# Patient Record
Sex: MALE | Race: WHITE | NOT HISPANIC OR LATINO | Employment: OTHER | URBAN - METROPOLITAN AREA
[De-identification: names, ages, dates, MRNs, and addresses within clinical notes are randomized per-mention and may not be internally consistent; named-entity substitution may affect disease eponyms.]

---

## 2020-03-04 ENCOUNTER — AMBULATORY - HEALTHEAST (OUTPATIENT)
Dept: LAB | Facility: HOSPITAL | Age: 75
End: 2020-03-04

## 2020-03-04 DIAGNOSIS — E11.9 DIABETES MELLITUS (H): ICD-10-CM

## 2020-03-04 LAB
ALBUMIN SERPL-MCNC: 3.8 G/DL (ref 3.5–5)
ALBUMIN UR-MCNC: NEGATIVE MG/DL
ALP SERPL-CCNC: 68 U/L (ref 45–120)
ALT SERPL W P-5'-P-CCNC: 20 U/L (ref 0–45)
ANION GAP SERPL CALCULATED.3IONS-SCNC: 9 MMOL/L (ref 5–18)
APPEARANCE UR: CLEAR
AST SERPL W P-5'-P-CCNC: 17 U/L (ref 0–40)
BACTERIA #/AREA URNS HPF: ABNORMAL HPF
BILIRUB SERPL-MCNC: 0.6 MG/DL (ref 0–1)
BILIRUB UR QL STRIP: NEGATIVE
BUN SERPL-MCNC: 22 MG/DL (ref 8–28)
CALCIUM SERPL-MCNC: 9.2 MG/DL (ref 8.5–10.5)
CHLORIDE BLD-SCNC: 102 MMOL/L (ref 98–107)
CO2 SERPL-SCNC: 29 MMOL/L (ref 22–31)
COLOR UR AUTO: YELLOW
CREAT SERPL-MCNC: 1.07 MG/DL (ref 0.7–1.3)
CREAT UR-MCNC: 61.9 MG/DL
GFR SERPL CREATININE-BSD FRML MDRD: >60 ML/MIN/1.73M2
GLUCOSE BLD-MCNC: 148 MG/DL (ref 70–125)
GLUCOSE UR STRIP-MCNC: ABNORMAL MG/DL
HGB UR QL STRIP: NEGATIVE
KETONES UR STRIP-MCNC: NEGATIVE MG/DL
LEUKOCYTE ESTERASE UR QL STRIP: NEGATIVE
MICROALBUMIN UR-MCNC: 1.03 MG/DL (ref 0–1.99)
MICROALBUMIN/CREAT UR: 16.6 MG/G
MUCOUS THREADS #/AREA URNS LPF: ABNORMAL LPF
NITRATE UR QL: NEGATIVE
PH UR STRIP: 6 [PH] (ref 4.5–8)
POTASSIUM BLD-SCNC: 3.8 MMOL/L (ref 3.5–5)
PROT SERPL-MCNC: 7.1 G/DL (ref 6–8)
RBC #/AREA URNS AUTO: ABNORMAL HPF
SODIUM SERPL-SCNC: 140 MMOL/L (ref 136–145)
SP GR UR STRIP: 1.02 (ref 1–1.03)
SQUAMOUS #/AREA URNS AUTO: ABNORMAL LPF
UROBILINOGEN UR STRIP-ACNC: ABNORMAL
WBC #/AREA URNS AUTO: ABNORMAL HPF

## 2020-08-27 ENCOUNTER — APPOINTMENT (OUTPATIENT)
Dept: URBAN - METROPOLITAN AREA CLINIC 257 | Age: 75
Setting detail: DERMATOLOGY
End: 2020-08-27

## 2020-08-27 DIAGNOSIS — L57.0 ACTINIC KERATOSIS: ICD-10-CM

## 2020-08-27 DIAGNOSIS — D18.0 HEMANGIOMA: ICD-10-CM

## 2020-08-27 DIAGNOSIS — L82.1 OTHER SEBORRHEIC KERATOSIS: ICD-10-CM

## 2020-08-27 DIAGNOSIS — L57.8 OTHER SKIN CHANGES DUE TO CHRONIC EXPOSURE TO NONIONIZING RADIATION: ICD-10-CM

## 2020-08-27 DIAGNOSIS — D22 MELANOCYTIC NEVI: ICD-10-CM

## 2020-08-27 PROBLEM — D18.01 HEMANGIOMA OF SKIN AND SUBCUTANEOUS TISSUE: Status: ACTIVE | Noted: 2020-08-27

## 2020-08-27 PROBLEM — D22.5 MELANOCYTIC NEVI OF TRUNK: Status: ACTIVE | Noted: 2020-08-27

## 2020-08-27 PROCEDURE — OTHER LIQUID NITROGEN: OTHER

## 2020-08-27 PROCEDURE — 99214 OFFICE O/P EST MOD 30 MIN: CPT | Mod: 25

## 2020-08-27 PROCEDURE — 17004 DESTROY PREMAL LESIONS 15/>: CPT

## 2020-08-27 PROCEDURE — OTHER COUNSELING: OTHER

## 2020-08-27 ASSESSMENT — LOCATION SIMPLE DESCRIPTION DERM
LOCATION SIMPLE: RIGHT FOREARM
LOCATION SIMPLE: LEFT HAND
LOCATION SIMPLE: ABDOMEN
LOCATION SIMPLE: LEFT FOREARM
LOCATION SIMPLE: RIGHT HAND
LOCATION SIMPLE: RIGHT FOREHEAD
LOCATION SIMPLE: NOSE
LOCATION SIMPLE: RIGHT TEMPLE
LOCATION SIMPLE: LEFT FOREHEAD
LOCATION SIMPLE: LEFT UPPER BACK

## 2020-08-27 ASSESSMENT — LOCATION DETAILED DESCRIPTION DERM
LOCATION DETAILED: LEFT VENTRAL PROXIMAL FOREARM
LOCATION DETAILED: LEFT LATERAL ABDOMEN
LOCATION DETAILED: RIGHT VENTRAL DISTAL FOREARM
LOCATION DETAILED: RIGHT MID TEMPLE
LOCATION DETAILED: LEFT SUPERIOR LATERAL FOREHEAD
LOCATION DETAILED: LEFT MID-UPPER BACK
LOCATION DETAILED: NASAL DORSUM
LOCATION DETAILED: LEFT ULNAR DORSAL HAND
LOCATION DETAILED: LEFT LATERAL FOREHEAD
LOCATION DETAILED: LEFT RIB CAGE
LOCATION DETAILED: RIGHT VENTRAL PROXIMAL FOREARM
LOCATION DETAILED: RIGHT ULNAR DORSAL HAND
LOCATION DETAILED: RIGHT PROXIMAL DORSAL FOREARM
LOCATION DETAILED: LEFT VENTRAL DISTAL FOREARM
LOCATION DETAILED: RIGHT RADIAL DORSAL HAND
LOCATION DETAILED: RIGHT SUPERIOR LATERAL FOREHEAD
LOCATION DETAILED: NASAL SUPRATIP
LOCATION DETAILED: LEFT SUPERIOR UPPER BACK
LOCATION DETAILED: LEFT PROXIMAL DORSAL FOREARM

## 2020-08-27 ASSESSMENT — LOCATION ZONE DERM
LOCATION ZONE: NOSE
LOCATION ZONE: ARM
LOCATION ZONE: FACE
LOCATION ZONE: TRUNK
LOCATION ZONE: HAND

## 2020-08-27 NOTE — PROCEDURE: LIQUID NITROGEN
Duration Of Freeze Thaw-Cycle (Seconds): 1
Post-Care Instructions: I reviewed with the patient in detail post-care instructions. Patient is to wear sunprotection, and avoid picking at any of the treated lesions. Pt may apply Vaseline to crusted or scabbing areas.
Detail Level: Simple
Render Post-Care Instructions In Note?: no
Consent: The patient's consent was obtained including but not limited to risks of crusting, scabbing, blistering, scarring, darker or lighter pigmentary change, recurrence, incomplete removal and infection.
Number Of Freeze-Thaw Cycles: 1 freeze-thaw cycle

## 2020-10-10 ENCOUNTER — APPOINTMENT (OUTPATIENT)
Dept: GENERAL RADIOLOGY | Facility: CLINIC | Age: 75
DRG: 270 | End: 2020-10-10
Attending: INTERNAL MEDICINE
Payer: COMMERCIAL

## 2020-10-10 ENCOUNTER — APPOINTMENT (OUTPATIENT)
Dept: INTERVENTIONAL RADIOLOGY/VASCULAR | Facility: CLINIC | Age: 75
DRG: 270 | End: 2020-10-10
Attending: STUDENT IN AN ORGANIZED HEALTH CARE EDUCATION/TRAINING PROGRAM
Payer: COMMERCIAL

## 2020-10-10 ENCOUNTER — APPOINTMENT (OUTPATIENT)
Dept: CARDIOLOGY | Facility: CLINIC | Age: 75
DRG: 270 | End: 2020-10-10
Attending: INTERNAL MEDICINE
Payer: COMMERCIAL

## 2020-10-10 ENCOUNTER — HOSPITAL ENCOUNTER (INPATIENT)
Facility: CLINIC | Age: 75
LOS: 4 days | Discharge: HOME | DRG: 270 | End: 2020-10-14
Attending: INTERNAL MEDICINE | Admitting: INTERNAL MEDICINE
Payer: COMMERCIAL

## 2020-10-10 ENCOUNTER — APPOINTMENT (OUTPATIENT)
Dept: ULTRASOUND IMAGING | Facility: CLINIC | Age: 75
DRG: 270 | End: 2020-10-10
Attending: STUDENT IN AN ORGANIZED HEALTH CARE EDUCATION/TRAINING PROGRAM
Payer: COMMERCIAL

## 2020-10-10 DIAGNOSIS — I26.02 ACUTE SADDLE PULMONARY EMBOLISM WITH ACUTE COR PULMONALE (H): ICD-10-CM

## 2020-10-10 DIAGNOSIS — I10 BENIGN ESSENTIAL HYPERTENSION: Primary | ICD-10-CM

## 2020-10-10 PROBLEM — I26.92 SADDLE PULMONARY EMBOLUS (H): Status: ACTIVE | Noted: 2020-10-10

## 2020-10-10 LAB
ALBUMIN SERPL-MCNC: 3.4 G/DL (ref 3.4–5)
ALP SERPL-CCNC: 92 U/L (ref 40–150)
ALT SERPL W P-5'-P-CCNC: 28 U/L (ref 0–70)
ANION GAP SERPL CALCULATED.3IONS-SCNC: 11 MMOL/L (ref 3–14)
AST SERPL W P-5'-P-CCNC: 15 U/L (ref 0–45)
BASE DEFICIT BLDA-SCNC: 0.7 MMOL/L
BILIRUB SERPL-MCNC: 0.7 MG/DL (ref 0.2–1.3)
BUN SERPL-MCNC: 17 MG/DL (ref 7–30)
CALCIUM SERPL-MCNC: 8.4 MG/DL (ref 8.5–10.1)
CHLORIDE SERPL-SCNC: 102 MMOL/L (ref 94–109)
CO2 SERPL-SCNC: 23 MMOL/L (ref 20–32)
CREAT SERPL-MCNC: 0.87 MG/DL (ref 0.66–1.25)
ERYTHROCYTE [DISTWIDTH] IN BLOOD BY AUTOMATED COUNT: 12.7 % (ref 10–15)
GFR SERPL CREATININE-BSD FRML MDRD: 85 ML/MIN/{1.73_M2}
GLUCOSE BLDC GLUCOMTR-MCNC: 102 MG/DL (ref 70–99)
GLUCOSE BLDC GLUCOMTR-MCNC: 122 MG/DL (ref 70–99)
GLUCOSE BLDC GLUCOMTR-MCNC: 140 MG/DL (ref 70–99)
GLUCOSE BLDC GLUCOMTR-MCNC: 143 MG/DL (ref 70–99)
GLUCOSE BLDC GLUCOMTR-MCNC: 158 MG/DL (ref 70–99)
GLUCOSE BLDC GLUCOMTR-MCNC: 178 MG/DL (ref 70–99)
GLUCOSE BLDC GLUCOMTR-MCNC: 193 MG/DL (ref 70–99)
GLUCOSE SERPL-MCNC: 159 MG/DL (ref 70–99)
HBA1C MFR BLD: 7.8 % (ref 0–5.6)
HCO3 BLD-SCNC: 23 MMOL/L (ref 21–28)
HCT VFR BLD AUTO: 51.9 % (ref 40–53)
HGB BLD-MCNC: 17.2 G/DL (ref 13.3–17.7)
INR PPP: 1.07 (ref 0.86–1.14)
KCT BLD-ACNC: 171 SEC (ref 75–150)
KCT BLD-ACNC: 205 SEC (ref 75–150)
LMWH PPP CHRO-ACNC: 0.37 IU/ML
LMWH PPP CHRO-ACNC: 0.88 IU/ML
LMWH PPP CHRO-ACNC: 0.94 IU/ML
MCH RBC QN AUTO: 30.2 PG (ref 26.5–33)
MCHC RBC AUTO-ENTMCNC: 33.1 G/DL (ref 31.5–36.5)
MCV RBC AUTO: 91 FL (ref 78–100)
NT-PROBNP SERPL-MCNC: 1285 PG/ML (ref 0–900)
O2/TOTAL GAS SETTING VFR VENT: ABNORMAL %
PCO2 BLD: 37 MM HG (ref 35–45)
PH BLD: 7.41 PH (ref 7.35–7.45)
PLATELET # BLD AUTO: 195 10E9/L (ref 150–450)
PO2 BLD: 75 MM HG (ref 80–105)
POTASSIUM SERPL-SCNC: 3.2 MMOL/L (ref 3.4–5.3)
PROT SERPL-MCNC: 7.1 G/DL (ref 6.8–8.8)
RBC # BLD AUTO: 5.69 10E12/L (ref 4.4–5.9)
SARS-COV-2 RNA SPEC QL NAA+PROBE: NOT DETECTED
SODIUM SERPL-SCNC: 136 MMOL/L (ref 133–144)
SPECIMEN SOURCE: NORMAL
TROPONIN I SERPL-MCNC: 0.09 UG/L (ref 0–0.04)
TROPONIN I SERPL-MCNC: 0.1 UG/L (ref 0–0.04)
WBC # BLD AUTO: 13.2 10E9/L (ref 4–11)

## 2020-10-10 PROCEDURE — 75743 ARTERY X-RAYS LUNGS: CPT

## 2020-10-10 PROCEDURE — 76937 US GUIDE VASCULAR ACCESS: CPT | Mod: 26 | Performed by: RADIOLOGY

## 2020-10-10 PROCEDURE — 36015 PLACE CATHETER IN ARTERY: CPT | Mod: 50 | Performed by: RADIOLOGY

## 2020-10-10 PROCEDURE — 999N000208 ECHOCARDIOGRAM COMPLETE

## 2020-10-10 PROCEDURE — 76937 US GUIDE VASCULAR ACCESS: CPT

## 2020-10-10 PROCEDURE — 250N000011 HC RX IP 250 OP 636: Performed by: RADIOLOGY

## 2020-10-10 PROCEDURE — 75743 ARTERY X-RAYS LUNGS: CPT | Mod: 26 | Performed by: RADIOLOGY

## 2020-10-10 PROCEDURE — 37211 THROMBOLYTIC ART THERAPY: CPT

## 2020-10-10 PROCEDURE — 71045 X-RAY EXAM CHEST 1 VIEW: CPT

## 2020-10-10 PROCEDURE — C1757 CATH, THROMBECTOMY/EMBOLECT: HCPCS

## 2020-10-10 PROCEDURE — 999N000007 HC SITE CHECK

## 2020-10-10 PROCEDURE — 85027 COMPLETE CBC AUTOMATED: CPT | Performed by: INTERNAL MEDICINE

## 2020-10-10 PROCEDURE — 71045 X-RAY EXAM CHEST 1 VIEW: CPT | Mod: 26 | Performed by: RADIOLOGY

## 2020-10-10 PROCEDURE — 255N000002 HC RX 255 OP 636: Performed by: RADIOLOGY

## 2020-10-10 PROCEDURE — C1769 GUIDE WIRE: HCPCS

## 2020-10-10 PROCEDURE — 999N000185 HC STATISTIC TRANSPORT TIME EA 15 MIN

## 2020-10-10 PROCEDURE — 272N000504 HC NEEDLE CR4

## 2020-10-10 PROCEDURE — 80053 COMPREHEN METABOLIC PANEL: CPT | Performed by: STUDENT IN AN ORGANIZED HEALTH CARE EDUCATION/TRAINING PROGRAM

## 2020-10-10 PROCEDURE — 999N001017 HC STATISTIC GLUCOSE BY METER IP

## 2020-10-10 PROCEDURE — 02HR33Z INSERTION OF INFUSION DEVICE INTO LEFT PULMONARY ARTERY, PERCUTANEOUS APPROACH: ICD-10-PCS | Performed by: RADIOLOGY

## 2020-10-10 PROCEDURE — 999N000128 HC STATISTIC PERIPHERAL IV START W/O US GUIDANCE

## 2020-10-10 PROCEDURE — 02HR32Z INSERTION OF MONITORING DEVICE INTO LEFT PULMONARY ARTERY, PERCUTANEOUS APPROACH: ICD-10-PCS | Performed by: RADIOLOGY

## 2020-10-10 PROCEDURE — 94660 CPAP INITIATION&MGMT: CPT

## 2020-10-10 PROCEDURE — 250N000009 HC RX 250: Performed by: STUDENT IN AN ORGANIZED HEALTH CARE EDUCATION/TRAINING PROGRAM

## 2020-10-10 PROCEDURE — 36415 COLL VENOUS BLD VENIPUNCTURE: CPT | Performed by: STUDENT IN AN ORGANIZED HEALTH CARE EDUCATION/TRAINING PROGRAM

## 2020-10-10 PROCEDURE — 83880 ASSAY OF NATRIURETIC PEPTIDE: CPT | Performed by: STUDENT IN AN ORGANIZED HEALTH CARE EDUCATION/TRAINING PROGRAM

## 2020-10-10 PROCEDURE — 82803 BLOOD GASES ANY COMBINATION: CPT | Performed by: INTERNAL MEDICINE

## 2020-10-10 PROCEDURE — 37184 PRIM ART M-THRMBC 1ST VSL: CPT | Mod: 50

## 2020-10-10 PROCEDURE — 99152 MOD SED SAME PHYS/QHP 5/>YRS: CPT

## 2020-10-10 PROCEDURE — 272N000192 HC ACCESSORY CR2

## 2020-10-10 PROCEDURE — 250N000013 HC RX MED GY IP 250 OP 250 PS 637: Performed by: STUDENT IN AN ORGANIZED HEALTH CARE EDUCATION/TRAINING PROGRAM

## 2020-10-10 PROCEDURE — 85610 PROTHROMBIN TIME: CPT | Performed by: STUDENT IN AN ORGANIZED HEALTH CARE EDUCATION/TRAINING PROGRAM

## 2020-10-10 PROCEDURE — 272N000246 HC CATH CR7

## 2020-10-10 PROCEDURE — 02CQ3ZZ EXTIRPATION OF MATTER FROM RIGHT PULMONARY ARTERY, PERCUTANEOUS APPROACH: ICD-10-PCS | Performed by: RADIOLOGY

## 2020-10-10 PROCEDURE — 36014 PLACE CATHETER IN ARTERY: CPT | Mod: RT

## 2020-10-10 PROCEDURE — 93306 TTE W/DOPPLER COMPLETE: CPT | Mod: 26 | Performed by: STUDENT IN AN ORGANIZED HEALTH CARE EDUCATION/TRAINING PROGRAM

## 2020-10-10 PROCEDURE — 258N000003 HC RX IP 258 OP 636: Performed by: RADIOLOGY

## 2020-10-10 PROCEDURE — 250N000011 HC RX IP 250 OP 636: Performed by: INTERNAL MEDICINE

## 2020-10-10 PROCEDURE — 255N000002 HC RX 255 OP 636: Performed by: STUDENT IN AN ORGANIZED HEALTH CARE EDUCATION/TRAINING PROGRAM

## 2020-10-10 PROCEDURE — 272N000199 HC ACCESSORY CR8

## 2020-10-10 PROCEDURE — 200N000002 HC R&B ICU UMMC

## 2020-10-10 PROCEDURE — 84484 ASSAY OF TROPONIN QUANT: CPT | Performed by: STUDENT IN AN ORGANIZED HEALTH CARE EDUCATION/TRAINING PROGRAM

## 2020-10-10 PROCEDURE — 36415 COLL VENOUS BLD VENIPUNCTURE: CPT | Performed by: INTERNAL MEDICINE

## 2020-10-10 PROCEDURE — 93970 EXTREMITY STUDY: CPT | Mod: 26 | Performed by: RADIOLOGY

## 2020-10-10 PROCEDURE — 85520 HEPARIN ASSAY: CPT | Performed by: STUDENT IN AN ORGANIZED HEALTH CARE EDUCATION/TRAINING PROGRAM

## 2020-10-10 PROCEDURE — 250N000011 HC RX IP 250 OP 636: Performed by: STUDENT IN AN ORGANIZED HEALTH CARE EDUCATION/TRAINING PROGRAM

## 2020-10-10 PROCEDURE — 272N000563 HC SHEATH CR14

## 2020-10-10 PROCEDURE — 99153 MOD SED SAME PHYS/QHP EA: CPT

## 2020-10-10 PROCEDURE — 99233 SBSQ HOSP IP/OBS HIGH 50: CPT | Mod: 25 | Performed by: INTERNAL MEDICINE

## 2020-10-10 PROCEDURE — 250N000012 HC RX MED GY IP 250 OP 636 PS 637: Performed by: STUDENT IN AN ORGANIZED HEALTH CARE EDUCATION/TRAINING PROGRAM

## 2020-10-10 PROCEDURE — 37184 PRIM ART M-THRMBC 1ST VSL: CPT | Mod: 50 | Performed by: RADIOLOGY

## 2020-10-10 PROCEDURE — C1887 CATHETER, GUIDING: HCPCS

## 2020-10-10 PROCEDURE — 96372 THER/PROPH/DIAG INJ SC/IM: CPT | Performed by: STUDENT IN AN ORGANIZED HEALTH CARE EDUCATION/TRAINING PROGRAM

## 2020-10-10 PROCEDURE — 36015 PLACE CATHETER IN ARTERY: CPT | Mod: LT

## 2020-10-10 PROCEDURE — 99152 MOD SED SAME PHYS/QHP 5/>YRS: CPT | Mod: GC | Performed by: RADIOLOGY

## 2020-10-10 PROCEDURE — 02HQ32Z INSERTION OF MONITORING DEVICE INTO RIGHT PULMONARY ARTERY, PERCUTANEOUS APPROACH: ICD-10-PCS | Performed by: RADIOLOGY

## 2020-10-10 PROCEDURE — 37211 THROMBOLYTIC ART THERAPY: CPT | Mod: XU | Performed by: RADIOLOGY

## 2020-10-10 PROCEDURE — 272N000566 HC SHEATH CR3

## 2020-10-10 PROCEDURE — 85520 HEPARIN ASSAY: CPT | Performed by: INTERNAL MEDICINE

## 2020-10-10 PROCEDURE — 99223 1ST HOSP IP/OBS HIGH 75: CPT | Mod: 25 | Performed by: INTERNAL MEDICINE

## 2020-10-10 PROCEDURE — 93970 EXTREMITY STUDY: CPT

## 2020-10-10 PROCEDURE — 02HP32Z INSERTION OF MONITORING DEVICE INTO PULMONARY TRUNK, PERCUTANEOUS APPROACH: ICD-10-PCS | Performed by: RADIOLOGY

## 2020-10-10 PROCEDURE — 272N000147 HC KIT CR7

## 2020-10-10 PROCEDURE — 272N000569 HC SHEATH CR6

## 2020-10-10 PROCEDURE — U0003 INFECTIOUS AGENT DETECTION BY NUCLEIC ACID (DNA OR RNA); SEVERE ACUTE RESPIRATORY SYNDROME CORONAVIRUS 2 (SARS-COV-2) (CORONAVIRUS DISEASE [COVID-19]), AMPLIFIED PROBE TECHNIQUE, MAKING USE OF HIGH THROUGHPUT TECHNOLOGIES AS DESCRIBED BY CMS-2020-01-R: HCPCS | Performed by: STUDENT IN AN ORGANIZED HEALTH CARE EDUCATION/TRAINING PROGRAM

## 2020-10-10 PROCEDURE — 85347 COAGULATION TIME ACTIVATED: CPT

## 2020-10-10 PROCEDURE — 83036 HEMOGLOBIN GLYCOSYLATED A1C: CPT | Performed by: INTERNAL MEDICINE

## 2020-10-10 PROCEDURE — 99223 1ST HOSP IP/OBS HIGH 75: CPT | Mod: AI | Performed by: INTERNAL MEDICINE

## 2020-10-10 PROCEDURE — 36600 WITHDRAWAL OF ARTERIAL BLOOD: CPT

## 2020-10-10 PROCEDURE — 999N000157 HC STATISTIC RCP TIME EA 10 MIN

## 2020-10-10 RX ORDER — NALOXONE HYDROCHLORIDE 0.4 MG/ML
.1-.4 INJECTION, SOLUTION INTRAMUSCULAR; INTRAVENOUS; SUBCUTANEOUS
Status: DISCONTINUED | OUTPATIENT
Start: 2020-10-10 | End: 2020-10-14 | Stop reason: HOSPADM

## 2020-10-10 RX ORDER — POTASSIUM CHLORIDE 1500 MG/1
80 TABLET, EXTENDED RELEASE ORAL ONCE
Status: COMPLETED | OUTPATIENT
Start: 2020-10-10 | End: 2020-10-10

## 2020-10-10 RX ORDER — ATENOLOL 50 MG/1
50 TABLET ORAL DAILY
Status: ON HOLD | COMMUNITY
End: 2020-10-14

## 2020-10-10 RX ORDER — HEPARIN SODIUM 1000 [USP'U]/ML
500-6000 INJECTION, SOLUTION INTRAVENOUS; SUBCUTANEOUS
Status: DISCONTINUED | OUTPATIENT
Start: 2020-10-10 | End: 2020-10-10 | Stop reason: HOSPADM

## 2020-10-10 RX ORDER — HEPARIN SODIUM 10000 [USP'U]/100ML
0-3500 INJECTION, SOLUTION INTRAVENOUS CONTINUOUS
Status: DISCONTINUED | OUTPATIENT
Start: 2020-10-10 | End: 2020-10-10

## 2020-10-10 RX ORDER — FLUMAZENIL 0.1 MG/ML
0.2 INJECTION, SOLUTION INTRAVENOUS
Status: DISCONTINUED | OUTPATIENT
Start: 2020-10-10 | End: 2020-10-10 | Stop reason: HOSPADM

## 2020-10-10 RX ORDER — NICOTINE POLACRILEX 4 MG
15-30 LOZENGE BUCCAL
Status: DISCONTINUED | OUTPATIENT
Start: 2020-10-10 | End: 2020-10-11

## 2020-10-10 RX ORDER — DOCUSATE SODIUM 100 MG/1
100 CAPSULE, LIQUID FILLED ORAL 2 TIMES DAILY
Status: DISCONTINUED | OUTPATIENT
Start: 2020-10-10 | End: 2020-10-12

## 2020-10-10 RX ORDER — SODIUM CHLORIDE 9 MG/ML
INJECTION, SOLUTION INTRAVENOUS CONTINUOUS
Status: DISCONTINUED | OUTPATIENT
Start: 2020-10-10 | End: 2020-10-13

## 2020-10-10 RX ORDER — ATORVASTATIN CALCIUM 20 MG/1
20 TABLET, FILM COATED ORAL DAILY
COMMUNITY

## 2020-10-10 RX ORDER — METOCLOPRAMIDE HYDROCHLORIDE 5 MG/ML
5 INJECTION INTRAMUSCULAR; INTRAVENOUS EVERY 6 HOURS PRN
Status: DISCONTINUED | OUTPATIENT
Start: 2020-10-10 | End: 2020-10-14 | Stop reason: HOSPADM

## 2020-10-10 RX ORDER — HEPARIN SODIUM 200 [USP'U]/100ML
1 INJECTION, SOLUTION INTRAVENOUS CONTINUOUS PRN
Status: DISCONTINUED | OUTPATIENT
Start: 2020-10-10 | End: 2020-10-10 | Stop reason: HOSPADM

## 2020-10-10 RX ORDER — ACETAMINOPHEN 325 MG/1
975 TABLET ORAL 3 TIMES DAILY
Status: DISCONTINUED | OUTPATIENT
Start: 2020-10-10 | End: 2020-10-12

## 2020-10-10 RX ORDER — POTASSIUM CHLORIDE 750 MG/1
40 TABLET, EXTENDED RELEASE ORAL ONCE
Status: DISCONTINUED | OUTPATIENT
Start: 2020-10-10 | End: 2020-10-10

## 2020-10-10 RX ORDER — SODIUM CHLORIDE 9 MG/ML
33 INJECTION, SOLUTION INTRAVENOUS CONTINUOUS
Status: DISCONTINUED | OUTPATIENT
Start: 2020-10-10 | End: 2020-10-13

## 2020-10-10 RX ORDER — DEXTROSE MONOHYDRATE 25 G/50ML
25-50 INJECTION, SOLUTION INTRAVENOUS
Status: DISCONTINUED | OUTPATIENT
Start: 2020-10-10 | End: 2020-10-14 | Stop reason: HOSPADM

## 2020-10-10 RX ORDER — ONDANSETRON 4 MG/1
4 TABLET, ORALLY DISINTEGRATING ORAL EVERY 6 HOURS PRN
Status: DISCONTINUED | OUTPATIENT
Start: 2020-10-10 | End: 2020-10-14 | Stop reason: HOSPADM

## 2020-10-10 RX ORDER — METOCLOPRAMIDE 5 MG/1
5 TABLET ORAL EVERY 6 HOURS PRN
Status: DISCONTINUED | OUTPATIENT
Start: 2020-10-10 | End: 2020-10-14 | Stop reason: HOSPADM

## 2020-10-10 RX ORDER — LISINOPRIL AND HYDROCHLOROTHIAZIDE 20; 25 MG/1; MG/1
1 TABLET ORAL DAILY
Status: ON HOLD | COMMUNITY
End: 2020-10-14

## 2020-10-10 RX ORDER — ONDANSETRON 2 MG/ML
4 INJECTION INTRAMUSCULAR; INTRAVENOUS EVERY 6 HOURS PRN
Status: DISCONTINUED | OUTPATIENT
Start: 2020-10-10 | End: 2020-10-14 | Stop reason: HOSPADM

## 2020-10-10 RX ORDER — NICOTINE POLACRILEX 4 MG
15-30 LOZENGE BUCCAL
Status: DISCONTINUED | OUTPATIENT
Start: 2020-10-10 | End: 2020-10-14 | Stop reason: HOSPADM

## 2020-10-10 RX ORDER — DEXTROSE MONOHYDRATE 25 G/50ML
25-50 INJECTION, SOLUTION INTRAVENOUS
Status: DISCONTINUED | OUTPATIENT
Start: 2020-10-10 | End: 2020-10-11

## 2020-10-10 RX ORDER — DIPHENHYDRAMINE HYDROCHLORIDE 50 MG/ML
25-50 INJECTION INTRAMUSCULAR; INTRAVENOUS
Status: COMPLETED | OUTPATIENT
Start: 2020-10-10 | End: 2020-10-10

## 2020-10-10 RX ORDER — PROCHLORPERAZINE MALEATE 5 MG
5 TABLET ORAL EVERY 6 HOURS PRN
Status: DISCONTINUED | OUTPATIENT
Start: 2020-10-10 | End: 2020-10-14 | Stop reason: HOSPADM

## 2020-10-10 RX ORDER — AMLODIPINE BESYLATE AND ATORVASTATIN CALCIUM 5; 10 MG/1; MG/1
1 TABLET, FILM COATED ORAL DAILY
Status: ON HOLD | COMMUNITY
End: 2020-10-14

## 2020-10-10 RX ORDER — NALOXONE HYDROCHLORIDE 0.4 MG/ML
.1-.4 INJECTION, SOLUTION INTRAMUSCULAR; INTRAVENOUS; SUBCUTANEOUS
Status: DISCONTINUED | OUTPATIENT
Start: 2020-10-10 | End: 2020-10-10 | Stop reason: HOSPADM

## 2020-10-10 RX ORDER — IODIXANOL 320 MG/ML
150 INJECTION, SOLUTION INTRAVASCULAR ONCE
Status: COMPLETED | OUTPATIENT
Start: 2020-10-10 | End: 2020-10-10

## 2020-10-10 RX ORDER — PROCHLORPERAZINE 25 MG
12.5 SUPPOSITORY, RECTAL RECTAL EVERY 12 HOURS PRN
Status: DISCONTINUED | OUTPATIENT
Start: 2020-10-10 | End: 2020-10-14 | Stop reason: HOSPADM

## 2020-10-10 RX ORDER — ASPIRIN 81 MG/1
81 TABLET ORAL DAILY
Status: ON HOLD | COMMUNITY
End: 2020-10-14

## 2020-10-10 RX ORDER — AMOXICILLIN 250 MG
1 CAPSULE ORAL 2 TIMES DAILY
Status: DISCONTINUED | OUTPATIENT
Start: 2020-10-10 | End: 2020-10-14 | Stop reason: HOSPADM

## 2020-10-10 RX ORDER — AMOXICILLIN 250 MG
2 CAPSULE ORAL 2 TIMES DAILY
Status: DISCONTINUED | OUTPATIENT
Start: 2020-10-10 | End: 2020-10-14 | Stop reason: HOSPADM

## 2020-10-10 RX ORDER — FENTANYL CITRATE 50 UG/ML
25-50 INJECTION, SOLUTION INTRAMUSCULAR; INTRAVENOUS EVERY 5 MIN PRN
Status: DISCONTINUED | OUTPATIENT
Start: 2020-10-10 | End: 2020-10-10 | Stop reason: HOSPADM

## 2020-10-10 RX ORDER — LIDOCAINE 40 MG/G
CREAM TOPICAL
Status: DISCONTINUED | OUTPATIENT
Start: 2020-10-10 | End: 2020-10-14 | Stop reason: HOSPADM

## 2020-10-10 RX ORDER — HEPARIN SODIUM 10000 [USP'U]/100ML
500 INJECTION, SOLUTION INTRAVENOUS CONTINUOUS
Status: DISCONTINUED | OUTPATIENT
Start: 2020-10-10 | End: 2020-10-13

## 2020-10-10 RX ADMIN — MIDAZOLAM 0.5 MG: 1 INJECTION INTRAMUSCULAR; INTRAVENOUS at 18:04

## 2020-10-10 RX ADMIN — FENTANYL CITRATE 50 MCG: 50 INJECTION, SOLUTION INTRAMUSCULAR; INTRAVENOUS at 17:47

## 2020-10-10 RX ADMIN — INSULIN ASPART 1 UNITS: 100 INJECTION, SOLUTION INTRAVENOUS; SUBCUTANEOUS at 09:00

## 2020-10-10 RX ADMIN — LIDOCAINE HYDROCHLORIDE 8 ML: 10 INJECTION, SOLUTION EPIDURAL; INFILTRATION; INTRACAUDAL; PERINEURAL at 20:07

## 2020-10-10 RX ADMIN — HEPARIN SODIUM 3 BAG: 200 INJECTION, SOLUTION INTRAVENOUS at 18:03

## 2020-10-10 RX ADMIN — HEPARIN SODIUM 2000 UNITS: 1000 INJECTION INTRAVENOUS; SUBCUTANEOUS at 19:26

## 2020-10-10 RX ADMIN — HEPARIN SODIUM 500 UNITS/HR: 10000 INJECTION, SOLUTION INTRAVENOUS at 20:29

## 2020-10-10 RX ADMIN — FENTANYL CITRATE 25 MCG: 50 INJECTION, SOLUTION INTRAMUSCULAR; INTRAVENOUS at 18:03

## 2020-10-10 RX ADMIN — FENTANYL CITRATE 50 MCG: 50 INJECTION, SOLUTION INTRAMUSCULAR; INTRAVENOUS at 20:13

## 2020-10-10 RX ADMIN — MIDAZOLAM 0.5 MG: 1 INJECTION INTRAMUSCULAR; INTRAVENOUS at 19:49

## 2020-10-10 RX ADMIN — FENTANYL CITRATE 25 MCG: 50 INJECTION, SOLUTION INTRAMUSCULAR; INTRAVENOUS at 18:23

## 2020-10-10 RX ADMIN — MIDAZOLAM 1 MG: 1 INJECTION INTRAMUSCULAR; INTRAVENOUS at 17:47

## 2020-10-10 RX ADMIN — MIDAZOLAM 0.5 MG: 1 INJECTION INTRAMUSCULAR; INTRAVENOUS at 19:29

## 2020-10-10 RX ADMIN — FENTANYL CITRATE 25 MCG: 50 INJECTION, SOLUTION INTRAMUSCULAR; INTRAVENOUS at 18:59

## 2020-10-10 RX ADMIN — HEPARIN SODIUM 4000 UNITS: 1000 INJECTION INTRAVENOUS; SUBCUTANEOUS at 18:35

## 2020-10-10 RX ADMIN — FENTANYL CITRATE 25 MCG: 50 INJECTION, SOLUTION INTRAMUSCULAR; INTRAVENOUS at 19:48

## 2020-10-10 RX ADMIN — ACETAMINOPHEN 975 MG: 325 TABLET, FILM COATED ORAL at 22:49

## 2020-10-10 RX ADMIN — Medication 2.5 MG: at 23:39

## 2020-10-10 RX ADMIN — ALTEPLASE 1 MG/HR: 2.2 INJECTION, POWDER, LYOPHILIZED, FOR SOLUTION INTRAVENOUS at 20:55

## 2020-10-10 RX ADMIN — FENTANYL CITRATE 25 MCG: 50 INJECTION, SOLUTION INTRAMUSCULAR; INTRAVENOUS at 18:15

## 2020-10-10 RX ADMIN — MIDAZOLAM 0.5 MG: 1 INJECTION INTRAMUSCULAR; INTRAVENOUS at 18:15

## 2020-10-10 RX ADMIN — MIDAZOLAM 0.5 MG: 1 INJECTION INTRAMUSCULAR; INTRAVENOUS at 18:59

## 2020-10-10 RX ADMIN — SODIUM CHLORIDE: 9 INJECTION, SOLUTION INTRAVENOUS at 20:31

## 2020-10-10 RX ADMIN — IODIXANOL 120 ML: 320 INJECTION, SOLUTION INTRAVASCULAR at 20:33

## 2020-10-10 RX ADMIN — POTASSIUM CHLORIDE 80 MEQ: 1500 TABLET, EXTENDED RELEASE ORAL at 06:10

## 2020-10-10 RX ADMIN — INSULIN ASPART 1 UNITS: 100 INJECTION, SOLUTION INTRAVENOUS; SUBCUTANEOUS at 23:30

## 2020-10-10 RX ADMIN — HUMAN ALBUMIN MICROSPHERES AND PERFLUTREN 6 ML: 10; .22 INJECTION, SOLUTION INTRAVENOUS at 09:30

## 2020-10-10 RX ADMIN — MIDAZOLAM 0.5 MG: 1 INJECTION INTRAMUSCULAR; INTRAVENOUS at 18:23

## 2020-10-10 RX ADMIN — DIPHENHYDRAMINE HYDROCHLORIDE 50 MG: 50 INJECTION INTRAMUSCULAR; INTRAVENOUS at 18:38

## 2020-10-10 RX ADMIN — INSULIN ASPART 2 UNITS: 100 INJECTION, SOLUTION INTRAVENOUS; SUBCUTANEOUS at 05:07

## 2020-10-10 RX ADMIN — HEPARIN SODIUM 2300 UNITS/HR: 10000 INJECTION, SOLUTION INTRAVENOUS at 02:55

## 2020-10-10 RX ADMIN — FENTANYL CITRATE 25 MCG: 50 INJECTION, SOLUTION INTRAMUSCULAR; INTRAVENOUS at 19:29

## 2020-10-10 RX ADMIN — HEPARIN SODIUM 2200 UNITS/HR: 10000 INJECTION, SOLUTION INTRAVENOUS at 13:31

## 2020-10-10 SDOH — HEALTH STABILITY: MENTAL HEALTH: HOW OFTEN DO YOU HAVE A DRINK CONTAINING ALCOHOL?: NOT ASKED

## 2020-10-10 SDOH — HEALTH STABILITY: MENTAL HEALTH: HOW MANY STANDARD DRINKS CONTAINING ALCOHOL DO YOU HAVE ON A TYPICAL DAY?: NOT ASKED

## 2020-10-10 SDOH — HEALTH STABILITY: MENTAL HEALTH: HOW OFTEN DO YOU HAVE 6 OR MORE DRINKS ON ONE OCCASION?: NOT ASKED

## 2020-10-10 ASSESSMENT — ACTIVITIES OF DAILY LIVING (ADL)
ADLS_ACUITY_SCORE: 15

## 2020-10-10 ASSESSMENT — MIFFLIN-ST. JEOR
SCORE: 2052.17
SCORE: 2028.13

## 2020-10-10 NOTE — PROGRESS NOTES
Admission          10/10/2020  1:40 AM  -----------------------------------------------------------  Reason for admission: SOB, PE  Primary team notified of pt arrival.  Admitted from: OSH  Via: stretcher  Accompanied by: family  Belongings: Placed in closet; valuables sent home with family  Admission Profile: complete  Teaching: orientation to unit and call light- call light within reach, call don't fall, use of console, meal times, when to call for the RN, and enforced importance of safety   Access: PIV  Telemetry:Placed on pt  Ht./Wt.: complete  Code Status verified on armband: yes  2 RN Skin Assessment Completed By: Myself and Lashawn REMY RN  Med Rec completed: No, pt unsure of home meds, wife will bring med list this AM      Temp:  [98.2  F (36.8  C)-98.8  F (37.1  C)] 98.8  F (37.1  C)  Pulse:  [81-83] 83  Resp:  [20] 20  BP: (129-135)/(77-80) 135/80  SpO2:  [93 %] 93 %

## 2020-10-10 NOTE — CONSULTS
Cardiology Consult                                                               October 10, 2020  Leander Wong MRN: [id number]  Age: 75 year old, : 1945        Reason for consult:      Saddle Pulmonary embolism        Assessment and Recommendation:     74 yo with past medical history significant for DM2 and HLD who presents as transfer from [facility] with unprovoked Saddle Pulmonary embolism.    1. Risk stratification:   Calculation of patient's Simplified Pulmonary Embolism Severity Index (sPESI) score: 1  RV strain by RV:LV ratio: unknown  RV enlargement and dysfunction by echo: Y  LE Doppler findings: none  Biomarker data: Troponin 0.089  Vital signs: HR 70, /77, SpO2 93% on 8L NC  Classification of PE (low risk; low-intermediate submassive; high-intermediate submassive; massive): Intermediate-high risk     2. Available treatment options:   Systemic lytics: absolute contraindications include active bleeding or GI bleed within 10 days, intracranial trauma within 3 months, or stroke or TIA in 2 months; otherwise relative include major nonvascular surgery or trauma within 10 days, uncontrolled hypertension. Alternative therapies to systemic lytics include 1) suction based thrombectomy (via minimally invasive catheter placement 2) catheter directed localized lytic infusion or 3) hybrid approach. These alternative therapies carry much lower risk for spontaneous ICH compared to systemic lytics with similar if not improved efficacy.     Given large thrombus burden with right heart strain, I would recommend IR guided thrombectomy. Given weeks of symptoms, I suspect this may be more chronic, and thus may require EKOS if insufficient thrombus removal. Possibly IVC filter if considerable LE thrombus burden. He should continue high intensity heparin in the interim.     3. Long term care:   Limited echocardiogram before discharge: Y, 24-48 hours to assess RV  Hypercoagulable work up indicated: Y  Length of  anticoagulation: at least 3-6 months  Outpatient follow up clinic: should follow up with cardiology in 2-4 weeks post discharge    Please order LE venous dopplers and NT-BNP for further risk stratification    Case discussed with PE Response Team Colleagues in Interventional Radiology, Critical Care, and Cardiology.      Patient discussed with staff attending, Dr. Chery and the note reflects our joint plan. Thank you for consulting the cardiovascular services at the [facility]. Please do not hesitate to call with questions or concerns.     Ki Gong MD    PGY-6, Cardiology Fellow  Pager: [phone]     Staff Attestation:   I have seen and examined this patient on October 10, 2020. I have discussed with the team and agree with the findings and plan in this note. I have personally reviewed vital signs, hemodynamics, medications, laboratory values, and diagnostic testing. PERT activated and taken to IR.    He has potential to develop CTEPH. We usually ensure 3 months of anticoagulation and then echocardiogram to screen for PH. If that is abnormal, I would be happy to see him in my clinic for CTEPH evaluation after anti-coagulation.      Riccardo Chery MD, PhD  Cardiovascular Division  [facility]          History of Present Illness:     Leander Wong is a 75 year old male who has a history of HTN, DM2, HLD and prostate cx s/p radical prostatectomy 2007 is admitted for chest pain and hypoxia, found to have saddle PE.     Leander endorses at least several weeks of increased exercise intolerance.  He states he has had more difficulty going hunting and taking his dog for daily walks.  He endorses increased shortness of breath at these walks now.  He also endorses several days of mild substernal chest pain that radiates to his back, he states at worst 4 out of 10.  The pain mildly worsens with exertion or deep breathing.  He endorses a mild cough for the past several  months as well as mild runny nose, however feels these are due to seasonal allergies.  He denies any syncope or presyncope.  He denies any swelling or tenderness of his calves or lower extremities.  He does endorse several 2.5-hour car rides in the last few days and had a 5-hour plane ride a few weeks ago.  He does endorse a severe illness in January 2020, which he thought may have been COVID, however reportedly had antibody testing which was negative.    Patient initially presented to the [facility], which was in large part due to his chest pain but also a temperature of 99.4, which she has been checking temperatures twice daily. I do not [facility] PE was performed, which demonstrated saddle PE. Here, Leander states he feels well. Chest pain is well controlled and denies SOB while at rest. He denies any LE edema.    A 13-point ROS is negative except as mentioned above      Past Medical History:     Patient Active Problem List   Diagnosis     Saddle pulmonary embolus (H)         Past Surgical History:      Past Surgical History:   Procedure Laterality Date     APPENDECTOMY       CHOLECYSTECTOMY           Social History:     Social History     Socioeconomic History     Marital status: Single     Spouse name: Not on file     Number of children: Not on file     Years of education: Not on file     Highest education level: Not on file   Occupational History     Not on file   Social Needs     Financial resource strain: Not on file     Food insecurity     Worry: Not on file     Inability: Not on file     Transportation needs     Medical: Not on file     Non-medical: Not on file   Tobacco Use     Smoking status: Former Smoker   Substance and Sexual Activity     Alcohol use: Not Currently     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)     Drug use: Not on file     Sexual activity: Not on file   Lifestyle     Physical activity     Days per week: Not on file     Minutes per session: Not on file      Stress: Not on file   Relationships     Social connections     Talks on phone: Not on file     Gets together: Not on file     Attends Yazidism service: Not on file     Active member of club or organization: Not on file     Attends meetings of clubs or organizations: Not on file     Relationship status: Not on file     Intimate partner violence     Fear of current or ex partner: Not on file     Emotionally abused: Not on file     Physically abused: Not on file     Forced sexual activity: Not on file   Other Topics Concern     Not on file   Social History Narrative     Not on file         Family History:     Family History   Problem Relation Age of Onset     Myocardial Infarction Mother 71     Myocardial Infarction Father 69     Unexplained death Sister          Allergies:     No Known Allergies      Medications:     No current facility-administered medications on file prior to encounter.        alpha-lipoic acid 100 MG capsule, Take 600 mg by mouth daily (with lunch)       amLODIPine-atorvastatin (CADUET) 5-10 MG tablet, Take 1 tablet by mouth daily       aspirin 81 MG EC tablet, Take 81 mg by mouth daily       atenolol (TENORMIN) 50 MG tablet, Take 50 mg by mouth daily       atorvastatin (LIPITOR) 20 MG tablet, Take 20 mg by mouth daily       lisinopril-hydrochlorothiazide (ZESTORETIC) 20-25 MG tablet, Take 1 tablet by mouth daily            Physical Exam:     B/P: 144/77, T: 98.2, P: 72, R: 18    Wt Readings from Last 4 Encounters:   10/10/20 128.5 kg (283 lb 4.8 oz)         Intake/Output Summary (Last 24 hours) at 10/10/2020 1435  Last data filed at 10/10/2020 1200  Gross per 24 hour   Intake 24.92 ml   Output 1100 ml   Net -1075.08 ml       Gen: AA&Ox3, no acute distress  HEENT:AT/ NC, PERRL b/l, EOM grossly intact, mucous membranes pink, moist without plaque or exudate  PULM/THORAX: Clear to auscultation bilaterally, no rales/rhonchi/wheezes  CV:RRR, S1 and S2 appreciated, no extra heart sounds, murmurs or  rub auscultated.  ABD: obese, soft, nontender, nondistended. Normoactive bowel sounds  EXT: No edema, clubbing or cyanosis. No asymmetrical edema or tenderness to palpation in calves bilaterally.  NEURO: CN II-XII intact, strength 5/5 throughout      Data:     Labs Reviewed on Admission    Troponin   Lab Results   Component Value Date    TROPI 0.089 (H) 10/10/2020     BMP  Recent Labs   Lab 10/10/20  0243      POTASSIUM 3.2*   CHLORIDE 102   SUN 8.4*   CO2 23   BUN 17   CR 0.87   *     CBC  Recent Labs   Lab 10/10/20  0243   WBC 13.2*   RBC 5.69   HGB 17.2   HCT 51.9   MCV 91   MCH 30.2   MCHC 33.1   RDW 12.7        INR  Recent Labs   Lab 10/10/20  0243   INR 1.07      Hepatic Panel   Lab Results   Component Value Date    AST 15 10/10/2020     Lab Results   Component Value Date    ALT 28 10/10/2020     No results found for: BILICONJ   Lab Results   Component Value Date    BILITOTAL 0.7 10/10/2020     Lab Results   Component Value Date    ALBUMIN 3.4 10/10/2020     Lab Results   Component Value Date    PROTTOTAL 7.1 10/10/2020      Lab Results   Component Value Date    ALKPHOS 92 10/10/2020           Most Recent Imaging:     EKG:   None on file    Echo: 10/10/20  Interpretation Summary  Stat TTE for patient with pulmonary embolism  Technically difficult study. Poor acoustic windows.     Global right ventricular function is moderately to severely reduced. Mild to  moderate right ventricular dilation is present. RV free wall hypokinesis with  RV apical hyperkinesis ([place]'s sign) noted.  Left ventricular function is normal.The EF is > 65%. LV regional wall motion  is probably normal.  Limited assessment of the valves.  IVC diameter <2.1 cm collapsing >50% with sniff suggests a normal RA pressure  of 3 mmHg.  There is no prior study for direct comparison    CT:   Reviewed outside films. Saddle PE

## 2020-10-10 NOTE — H&P
[facility]     History and Physical - [facility] Night Service        Date of Admission:  10/10/2020    Assessment & Plan   Leander Wong is a 75 year old male admitted on 10/10/2020. He has a history of DM2 and HLD and [facility] with saddle pulmonary embolus and distal PE, unprovoked and is currently being medically managed.     #Saddle pulmonary embolus  #Distal pulmonary embolus  #Hypoxic respiratory failure  Hemodynamically stable on admission. RA at baseline, requiring 4L O2 NC. Elevated D-dimer per transfer note with CT-PE positive for saddle, distal PE's. Unprovoked. Started on Heparin gtt, stable.    - Per transfer note, images will be pushed.    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   - Continuous Heparin gtt   - Continuous pulse Ox with supplemental O2 PRN   - Step down unit with tele   - Stat echo ordered to terri ENRRIQUE heart strain   - Baseline labs ordered: ABG, CBC, CMP    #HTN   - Holding PTA Lisinopril 20; hydrochlorothiazide 25; Amlodipine 2.5; atenolol 50    #DM2   - Holding PTA diabetes meds (Empagliflozin 25, Semiglutide 0.5mg/wk; Glipizide 5)   - Medium dose sliding scale insulin with q4h BG checks while NPO   - AM Hgb A1c     #Hyperlipidemia   - Did not re-order PTA Atorva 20    #Hypokalemia  3.2 on admission   - 80mEq KCl ordered    #Leukocytosis, mild  Likely reactionary 2/2 PE's. No s/sx of infection currently, afebrile, appears well. No recent tobacco use, no hx causative meds, no vigorous exercise, no surgery/trauma.   - No additional workup indicated at this time.    #Loose stools, chronic  Years per pt of ~2x/day loose stools. Denies red/black. Pt denies any recent changes to stool character.   - Will defer additional workup considerations until pt is no longer acutely ill    #COVID testing   PCR negative at OSH per provider note   - Repeat swab here      "  Diet: NPO for Medical/Clinical Reasons Except for: Meds    Fluids: none ordered on admission  DVT Prophylaxis: continuous Heparin gtt  Gongora Catheter: not present  Code Status: Full Code           Disposition Plan   Expected discharge: 2 - 3 days, recommended to prior living arrangement once saddle embolus appropriately treated and pt's respiratory status is improving appropriately.  Entered: Suni Gomes MD 10/10/2020, 3:03 AM       The patient's care was discussed with the Patient.     Suni Gomes MD  [facility]   Contact information available via [facility] Paging/Directory  Please see sign in/sign out for up to date coverage information  ______________________________________________________________________    Chief Complaint   Chest pain    History is obtained from the patient    History of Present Illness   Leander Wong is a 75 year old male who has a history of HTN, DM2, HLD and prostate cx s/p radical prostatectomy 2007 is admitted for chest pain and hypoxia, found to have saddle PE.       Pt is a 76yo M who presented with acute on subacute chest pain. Pt has noticed for past several weeks-couple of months that his breathing and stamina has worsened slowly, noting that it has been a little harder to go hunting with his dog and take his dog for daily ~30min walks. These activities previously gave pt no issues, and recently he has felt he is walking slower and \"huffing and puffing\" a little more than his normal, which is partially why pt sought medical care on 10/9. Pt has also noted mild chest pain and back pain for several days, in the center of his chest and back, described as 4/10 at its worst and 1-2/10 currently after morphine. Pt states pain feels like \"someone is driving their knuckles into the center of my chest\" and \"someone is pushing a 2x4 between my shoulder blades\", respectively. Pt states pain is mildly worsened with " "activity and deep breathing. Pt states he has felt a mild cough for several months and mild runny nose, both of which he has attributed to seasonal allergies. Pt states he was in a car ride approx 2.5hrs one way in the last few days, and was on an approx 5hr plane ride in the last few weeks. Denies swelling, calf pain/tenderness/redness. No recent trauma, other immobility, surgery. No Hx DVT/PE.   Pt was \"sicker than a dog\" in January 2020 and thought that he may have had COVID. At some point since, pt has been tested for Antibodies in hopes of donating plasma, which were negative.  Pt has then been routinely taking his temp BID for several months to monitor, stating he is almost always 98.2. Prior to presentation, pt's temp was 99.1, so he called his doctor (PCP?), and his recheck was 99.4. Pt also mentioned his breathing symptoms to doctor, who advised him to go to ED.     On ROS, pt states he has a mild HA which is easily distractible; no vision changes. No trouble swallowing, N/V. States reflux-like symptoms for several days, which is new for him when he eats. Chronic diarrhea per pt- years, 2 loose stools per day, denies red/black although pt states he \"doesn't ever turn around to look\". No recent changes in weight. Fatigue, CP, cough per above. No swelling, new rashes, or any other concerns per pt. Pt is former smoker, undocumented quit date. Sober with EtOH x 30yrs, denies other drug use.     Pt is OSH transfer; records not yet available. [facility], [facility].    Review of Systems    The 10 point Review of Systems is negative other than noted in the HPI or here.     Past Medical History    I have reviewed this patient's medical history and updated it with pertinent information if needed.   Past Medical History:   Diagnosis Date     Diabetes mellitus, type 2 (H)      Essential hypertension      Mixed hyperlipidemia      Prostate cancer (H) 2007    s/p radical prostatectomy "       Past Surgical History   I have reviewed this patient's surgical history and updated it with pertinent information if needed.  Past Surgical History:   Procedure Laterality Date     APPENDECTOMY       CHOLECYSTECTOMY          Social History   I have reviewed this patient's social history and updated it with pertinent information if needed. Leander Wong  reports that he has quit smoking. He does not have any smokeless tobacco history on file. He reports previous alcohol use.    Family History   I have reviewed this patient's family history and updated it with pertinent information if needed.  Family History   Problem Relation Age of Onset     Myocardial Infarction Mother 71     Myocardial Infarction Father 69     Unexplained death Sister        Prior to Admission Medications   None   [facility] pt    Per CPRS review:  Lisinopril 20  hydrochlorothiazide 25  Amlodipine 5  Atorvastatin 20  Empagliflozin 25  Semiglutide 0.5mg qw  Glipizide 5  Loratadine 10  ASA 81      Allergies   No Known Allergies    Physical Exam   Vital Signs: Temp: 98.8  F (37.1  C) Temp src: Oral BP: 135/80 Pulse: 83   Resp: 20 SpO2: 93 % O2 Device: Nasal cannula Oxygen Delivery: 4 LPM  Weight: 283 lbs 4.8 oz    Physical Exam  Constitutional:       General: He is not in acute distress.     Appearance: Normal appearance. He is obese. He is not ill-appearing, toxic-appearing or diaphoretic.   HENT:      Head: Normocephalic and atraumatic.      Nose: No congestion or rhinorrhea.      Mouth/Throat:      Mouth: Mucous membranes are moist.      Pharynx: Oropharynx is clear. No posterior oropharyngeal erythema.   Eyes:      General: No scleral icterus.     Extraocular Movements: Extraocular movements intact.      Conjunctiva/sclera: Conjunctivae normal.   Neck:      Musculoskeletal: Normal range of motion and neck supple.   Cardiovascular:      Rate and Rhythm: Normal rate and regular rhythm.      Pulses: Normal pulses.      Heart sounds: Murmur present. No  friction rub. No gallop.       Comments: 1/6 systolic murmur RUSB  Pulmonary:      Effort: Pulmonary effort is normal. No respiratory distress.      Breath sounds: Normal breath sounds. No stridor. No wheezing, rhonchi or rales.   Chest:      Chest wall: No tenderness.   Abdominal:      General: There is no distension.      Palpations: Abdomen is soft. There is no mass.      Tenderness: There is abdominal tenderness. There is no guarding or rebound.      Comments: Mild TTP in LLQ   Musculoskeletal: Normal range of motion.         General: No swelling, tenderness or deformity.      Right lower leg: No edema.      Left lower leg: No edema.   Skin:     General: Skin is warm and dry.      Coloration: Skin is not jaundiced or pale.      Findings: No bruising, erythema, lesion or rash.   Neurological:      General: No focal deficit present.      Mental Status: He is alert and oriented to person, place, and time.      Motor: No weakness.   Psychiatric:         Mood and Affect: Mood normal.         Behavior: Behavior normal.         Thought Content: Thought content normal.         Judgment: Judgment normal.            Data   Data reviewed today: I reviewed all medications, new labs and imaging results over the last 24 hours. I personally reviewed on continuous tele; NSR      Recent Labs   Lab 10/10/20  0243   WBC 13.2*   HGB 17.2   MCV 91      INR 1.07

## 2020-10-10 NOTE — IR NOTE
Patient Name: Leander Wong  Medical Record Number: [id number]  Today's Date: 10/10/2020    Procedure: Bilateral pulmonary angiogram with thrombectomy and thrombolysis  Proceduralist: Dr. Sandra MD and Dr. Daniel MD    Procedure Start: 1755  Procedure end: 2009  Sedation medications administered: 50 mg benadryl, 250 mcg fentanyl, 4 mg versed    Report given to: Liam RUTH 4A  : JOAN    Other Notes: Pt arrived to IR room 1 from 6B. Consent reviewed. Pt denies any questions or concerns regarding procedure. Pt positioned supine and monitored per protocol. Pt tolerated procedure without any noted complications. Pt transferred back to .    Pt refused gecko nose pad when wearing bipap.

## 2020-10-10 NOTE — PLAN OF CARE
A/O X4. Able to make needs known, uses call light appropriately. HR SR 70-80s, VSS; afebrile; Lung sounds clear/dim on 8L oxyplus mask. Breathing unchanged. Denies pain. adequate urine output. Up SBA. Heparin infusing at 2200 units, next 10A re-check at 1145pm. Cxr, echo, and ultra sound of BLE completed. NPO. Pt currently down in IR for thrombectomy. Will continue to monitor and follow plan of care.

## 2020-10-10 NOTE — PROVIDER NOTIFICATION
[facility] cross-cover notified that patient started night on 4L when admitted from OSH. Patient was increased to 6L when MD was previously seeing patient d/t mildly low)  on ABG. Patient now needing 8L oxiplus to maintain O2 sats 92-93%. Patient became dizzy/lightheaded/diaphoretic with ambulating. BP when back in bed unremarkable. No CP/worsened breathing at rest. MD informed that currently on monitor patient 90-93%, will possibly increase again. Will continue to monitor and update team with changes.

## 2020-10-10 NOTE — PRE-PROCEDURE
GENERAL PRE-PROCEDURE:   Procedure:  Bilateral Pulmonary Angiograms with Possible Intervention  Date/Time:  10/10/2020 5:36 PM    Verbal consent obtained?: Yes    Written consent obtained?: Yes    Risks and benefits: Risks, benefits and alternatives were discussed    Consent given by:  Patient  Patient states understanding of procedure being performed: Yes    Patient's understanding of procedure matches consent: Yes    Procedure consent matches procedure scheduled: Yes    Expected level of sedation:  Moderate  Appropriately NPO:  Yes  ASA Class:  Class 2- mild systemic disease, no acute problems, no functional limitations  Mallampati  :  Grade 2- soft palate, base of uvula, tonsillar pillars, and portion of posterior pharyngeal wall visible  Lungs:  Lungs clear with good breath sounds bilaterally  Heart:  Normal heart sounds and rate  History & Physical reviewed:  History and physical reviewed and no updates needed  Statement of review:  I have reviewed the lab findings, diagnostic data, medications, and the plan for sedation

## 2020-10-10 NOTE — CONSULTS
INTERVENTIONAL RADIOLOGY CONSULT NOTE    Reason for referral:   Pulmonary artery angiogram with intervention    History:   Patient is on IR schedule bilateral pulmonary angiogram with intervention for a bilateral pulmonary emboli. Echo demonstrates right heart strain with an elevated tropoinin. Patient is currently on a heparin gtt.  Consent has been obtained for the procedure.    Labs:  Lab Results   Component Value Date    HGB 17.2 10/10/2020     Lab Results   Component Value Date     10/10/2020     Lab Results   Component Value Date    WBC 13.2 10/10/2020       Lab Results   Component Value Date    INR 1.07 10/10/2020       Lab Results   Component Value Date    PROTTOTAL 7.1 10/10/2020      Lab Results   Component Value Date    ALBUMIN 3.4 10/10/2020     Lab Results   Component Value Date    BILITOTAL 0.7 10/10/2020     No results found for: BILICONJ   Lab Results   Component Value Date    ALKPHOS 92 10/10/2020     Lab Results   Component Value Date    AST 15 10/10/2020     Lab Results   Component Value Date    ALT 28 10/10/2020       Lab Results   Component Value Date    CR 0.87 10/10/2020     Lab Results   Component Value Date    BUN 17 10/10/2020       Imaging:   CT Chest 10/9/20 at an OSH demonstrates large saddle pulmonary emboli with smaller distal emboli.    Assessment:   Patient is a 75M with history of prostate cancer that had a non-provoked PE. Echo demonstrates right heart strain and a troponin elevation.     Plan:   -Will plan for emergent pulmonary artery angiogram with intervention.  -Obtain STAT b/l lower extremity venous US.     Discussed with staff, Dr. Flores.    Harmeet Sanchez MD  Diagnostic Radiology Resident

## 2020-10-10 NOTE — PLAN OF CARE
Neuro: A&Ox4. Denies numbness/tingling.  Cardiac: SR, HR 70s-90s. VSS. SBP 120s-140s.   Respiratory: Sating 90-92% on 4L initially, increased to 8L this AM. MD aware.   GI/: Adequate urine output, no BM. No N/V.   Diet/appetite: Tolerating NPO.   Activity:  Assist of 1 to ambulate. Dizzy/lightheaded with ambulation.  Pain: Denies pain.  Skin: No new deficits noted.   LDA's: R PIV, heparin at 2300u/hr.    Plan: Continue with POC. Notify primary team with changes.

## 2020-10-11 ENCOUNTER — APPOINTMENT (OUTPATIENT)
Dept: CT IMAGING | Facility: CLINIC | Age: 75
DRG: 270 | End: 2020-10-11
Attending: STUDENT IN AN ORGANIZED HEALTH CARE EDUCATION/TRAINING PROGRAM
Payer: COMMERCIAL

## 2020-10-11 LAB
ANION GAP SERPL CALCULATED.3IONS-SCNC: 8 MMOL/L (ref 3–14)
BUN SERPL-MCNC: 27 MG/DL (ref 7–30)
CALCIUM SERPL-MCNC: 7.8 MG/DL (ref 8.5–10.1)
CHLORIDE SERPL-SCNC: 106 MMOL/L (ref 94–109)
CO2 SERPL-SCNC: 23 MMOL/L (ref 20–32)
CREAT SERPL-MCNC: 0.92 MG/DL (ref 0.66–1.25)
ERYTHROCYTE [DISTWIDTH] IN BLOOD BY AUTOMATED COUNT: 12.9 % (ref 10–15)
ERYTHROCYTE [DISTWIDTH] IN BLOOD BY AUTOMATED COUNT: 13 % (ref 10–15)
FIBRINOGEN PPP-MCNC: 326 MG/DL (ref 200–420)
FIBRINOGEN PPP-MCNC: 348 MG/DL (ref 200–420)
GFR SERPL CREATININE-BSD FRML MDRD: 83 ML/MIN/{1.73_M2}
GLUCOSE BLDC GLUCOMTR-MCNC: 104 MG/DL (ref 70–99)
GLUCOSE BLDC GLUCOMTR-MCNC: 132 MG/DL (ref 70–99)
GLUCOSE BLDC GLUCOMTR-MCNC: 144 MG/DL (ref 70–99)
GLUCOSE BLDC GLUCOMTR-MCNC: 165 MG/DL (ref 70–99)
GLUCOSE BLDC GLUCOMTR-MCNC: 91 MG/DL (ref 70–99)
GLUCOSE SERPL-MCNC: 140 MG/DL (ref 70–99)
HCT VFR BLD AUTO: 43.3 % (ref 40–53)
HCT VFR BLD AUTO: 44.8 % (ref 40–53)
HGB BLD-MCNC: 13.9 G/DL (ref 13.3–17.7)
HGB BLD-MCNC: 14.6 G/DL (ref 13.3–17.7)
INR PPP: 1.1 (ref 0.86–1.14)
LMWH PPP CHRO-ACNC: <0.1 IU/ML
MAGNESIUM SERPL-MCNC: 2.3 MG/DL (ref 1.6–2.3)
MCH RBC QN AUTO: 30.3 PG (ref 26.5–33)
MCH RBC QN AUTO: 30.7 PG (ref 26.5–33)
MCHC RBC AUTO-ENTMCNC: 32.1 G/DL (ref 31.5–36.5)
MCHC RBC AUTO-ENTMCNC: 32.6 G/DL (ref 31.5–36.5)
MCV RBC AUTO: 94 FL (ref 78–100)
MCV RBC AUTO: 94 FL (ref 78–100)
PLATELET # BLD AUTO: 130 10E9/L (ref 150–450)
PLATELET # BLD AUTO: 162 10E9/L (ref 150–450)
POTASSIUM SERPL-SCNC: 4 MMOL/L (ref 3.4–5.3)
RBC # BLD AUTO: 4.59 10E12/L (ref 4.4–5.9)
RBC # BLD AUTO: 4.75 10E12/L (ref 4.4–5.9)
SODIUM SERPL-SCNC: 137 MMOL/L (ref 133–144)
WBC # BLD AUTO: 11.3 10E9/L (ref 4–11)
WBC # BLD AUTO: 9.1 10E9/L (ref 4–11)

## 2020-10-11 PROCEDURE — 83735 ASSAY OF MAGNESIUM: CPT | Performed by: STUDENT IN AN ORGANIZED HEALTH CARE EDUCATION/TRAINING PROGRAM

## 2020-10-11 PROCEDURE — 36415 COLL VENOUS BLD VENIPUNCTURE: CPT | Performed by: RADIOLOGY

## 2020-10-11 PROCEDURE — 250N000013 HC RX MED GY IP 250 OP 250 PS 637: Performed by: STUDENT IN AN ORGANIZED HEALTH CARE EDUCATION/TRAINING PROGRAM

## 2020-10-11 PROCEDURE — 85520 HEPARIN ASSAY: CPT | Performed by: INTERNAL MEDICINE

## 2020-10-11 PROCEDURE — 85027 COMPLETE CBC AUTOMATED: CPT | Performed by: RADIOLOGY

## 2020-10-11 PROCEDURE — 200N000002 HC R&B ICU UMMC

## 2020-10-11 PROCEDURE — 36415 COLL VENOUS BLD VENIPUNCTURE: CPT | Performed by: INTERNAL MEDICINE

## 2020-10-11 PROCEDURE — 71275 CT ANGIOGRAPHY CHEST: CPT

## 2020-10-11 PROCEDURE — 85610 PROTHROMBIN TIME: CPT | Performed by: INTERNAL MEDICINE

## 2020-10-11 PROCEDURE — 258N000003 HC RX IP 258 OP 636: Performed by: RADIOLOGY

## 2020-10-11 PROCEDURE — 250N000011 HC RX IP 250 OP 636: Performed by: STUDENT IN AN ORGANIZED HEALTH CARE EDUCATION/TRAINING PROGRAM

## 2020-10-11 PROCEDURE — 99233 SBSQ HOSP IP/OBS HIGH 50: CPT | Mod: GC | Performed by: INTERNAL MEDICINE

## 2020-10-11 PROCEDURE — 250N000011 HC RX IP 250 OP 636: Performed by: RADIOLOGY

## 2020-10-11 PROCEDURE — 85384 FIBRINOGEN ACTIVITY: CPT | Performed by: INTERNAL MEDICINE

## 2020-10-11 PROCEDURE — 36415 COLL VENOUS BLD VENIPUNCTURE: CPT | Performed by: STUDENT IN AN ORGANIZED HEALTH CARE EDUCATION/TRAINING PROGRAM

## 2020-10-11 PROCEDURE — 85384 FIBRINOGEN ACTIVITY: CPT | Performed by: RADIOLOGY

## 2020-10-11 PROCEDURE — 999N001017 HC STATISTIC GLUCOSE BY METER IP

## 2020-10-11 PROCEDURE — 80048 BASIC METABOLIC PNL TOTAL CA: CPT | Performed by: STUDENT IN AN ORGANIZED HEALTH CARE EDUCATION/TRAINING PROGRAM

## 2020-10-11 PROCEDURE — 85027 COMPLETE CBC AUTOMATED: CPT | Performed by: INTERNAL MEDICINE

## 2020-10-11 PROCEDURE — 85027 COMPLETE CBC AUTOMATED: CPT | Performed by: STUDENT IN AN ORGANIZED HEALTH CARE EDUCATION/TRAINING PROGRAM

## 2020-10-11 PROCEDURE — 71275 CT ANGIOGRAPHY CHEST: CPT | Mod: 26 | Performed by: RADIOLOGY

## 2020-10-11 RX ORDER — IOPAMIDOL 755 MG/ML
77 INJECTION, SOLUTION INTRAVASCULAR ONCE
Status: COMPLETED | OUTPATIENT
Start: 2020-10-11 | End: 2020-10-11

## 2020-10-11 RX ADMIN — Medication 2.5 MG: at 02:00

## 2020-10-11 RX ADMIN — SODIUM CHLORIDE: 9 INJECTION, SOLUTION INTRAVENOUS at 21:53

## 2020-10-11 RX ADMIN — Medication 2.5 MG: at 12:51

## 2020-10-11 RX ADMIN — Medication 2.5 MG: at 11:45

## 2020-10-11 RX ADMIN — ACETAMINOPHEN 975 MG: 325 TABLET, FILM COATED ORAL at 19:39

## 2020-10-11 RX ADMIN — ACETAMINOPHEN 975 MG: 325 TABLET, FILM COATED ORAL at 07:54

## 2020-10-11 RX ADMIN — Medication 2.5 MG: at 08:33

## 2020-10-11 RX ADMIN — ALTEPLASE 1 MG/HR: 2.2 INJECTION, POWDER, LYOPHILIZED, FOR SOLUTION INTRAVENOUS at 13:52

## 2020-10-11 RX ADMIN — INSULIN ASPART 1 UNITS: 100 INJECTION, SOLUTION INTRAVENOUS; SUBCUTANEOUS at 11:52

## 2020-10-11 RX ADMIN — ACETAMINOPHEN 975 MG: 325 TABLET, FILM COATED ORAL at 13:59

## 2020-10-11 RX ADMIN — SODIUM CHLORIDE: 9 INJECTION, SOLUTION INTRAVENOUS at 09:30

## 2020-10-11 RX ADMIN — Medication 2.5 MG: at 05:19

## 2020-10-11 RX ADMIN — IOPAMIDOL 77 ML: 755 INJECTION, SOLUTION INTRAVENOUS at 12:34

## 2020-10-11 RX ADMIN — SODIUM CHLORIDE: 9 INJECTION, SOLUTION INTRAVENOUS at 22:46

## 2020-10-11 RX ADMIN — Medication 2.5 MG: at 23:38

## 2020-10-11 RX ADMIN — Medication 2.5 MG: at 19:39

## 2020-10-11 RX ADMIN — Medication 2.5 MG: at 17:25

## 2020-10-11 RX ADMIN — INSULIN ASPART 1 UNITS: 100 INJECTION, SOLUTION INTRAVENOUS; SUBCUTANEOUS at 20:09

## 2020-10-11 ASSESSMENT — ACTIVITIES OF DAILY LIVING (ADL)
ADLS_ACUITY_SCORE: 15

## 2020-10-11 ASSESSMENT — MIFFLIN-ST. JEOR: SCORE: 2028.13

## 2020-10-11 NOTE — PROGRESS NOTES
"    Interventional Radiology Progress Note     October 11, 2020    Subjective:   Patient is doing well after thrombectomy. Tolerating lytic therapy. No signs of bleeding. Tolerating diet. CT scan demonstrated residual clot in the left PA and smaller subsegmental areas. Patient oxygen improving. Right groin puncture site is c/d/i. No additional complaints.    Objective: /68 (BP Location: Left arm)   Pulse 65   Temp 99.4  F (37.4  C) (Oral)   Resp 16   Ht 1.803 m (5' 11\")   Wt 126.1 kg (278 lb)   SpO2 95%   BMI 38.77 kg/m       CBC RESULTS:   Recent Labs   Lab Test 10/11/20  1636   WBC 9.1   RBC 4.59   HGB 13.9   HCT 43.3   MCV 94   MCH 30.3   MCHC 32.1   RDW 13.0   *   Fibrinogen: 348  Heparin 10A: <0.10      Intake/Output Summary (Last 24 hours) at 10/11/2020 1720  Last data filed at 10/11/2020 1600  Gross per 24 hour   Intake 3476.25 ml   Output 1800 ml   Net 1676.25 ml       Imaging:  CT Chest 10/11/20:  1. Persistent pulmonary emboli, including a nonocclusive embolism at  the bifurcation of the left pulmonary artery. There are segmental  occlusive thrombi in the left upper and left lower lobes, and  segmental nonocclusive thrombi in the right upper and right lower  lobes.  2. Cardiomegaly with right ventricular dilatation, suggestive of right  heart strain.  3. Small bilateral pleural effusions with areas of associated  atelectasis.    Physical Exam:  Gen: Awake, alert and in NAD.  Pulm: Non-labored breathing. Resting comfortably in bed.  Ext: Right groin puncture site with some dried blood at the site. Catheter in place. No signs of bleeding. Mildly TTP.    Assessment/Plan: 75 year old male POD#1 following bilateral pulmonary artery angiograms with suction thrombectomy and lytic therapy. Patient is doing well with improved oxygenation and decreased shortness of breath and cough.   -Will continue lytic therapy 1mg/hr and heparin at 500u/hr for another 16-20 hours  -Follow up CBC and fibrinogen " levels at 4am  -Regular diet, then CLD at midnight.   -NPO starting at 9am tomorrow.     Patient seen and evaluated with staff, Dr. Flores.    Harmeet Sanchez MD  Diagnostic Radiology Resident  October 11, 2020

## 2020-10-11 NOTE — PROGRESS NOTES
Critical Care Cardiology Progress Note  Leander Wong MRN: [id number]  Age: 75 year old, : 1945  Date: 10/10/2020            Assessment and Plan:     Leander Wong is a 75 year old male w/ HTN, DM presenting with AHRF 2/2 saddle PE s/p IR thrombectomy and placement of EKOS thrombolysis catheters.       #Saddle pulmonary embolus  #Distal pulmonary embolus  #Hypoxic respiratory failure  #Acute RV systolic heart failure  Hemodynamically stable on admission. RA at baseline, requiring 4L O2 NC. Elevated D-dimer per transfer note with CT-PE positive for saddle, distal PE's. Unprovoked. Started on Heparin gtt, stable. Now s/p IR thrombectomy, placement of EKOS thrombolysis catheters. RV failure noted on TTE 2/2 submassive PE.   - Per transfer note, images will be pushed.    - EKOS catheter management per IR   - Heparin ggt  - Right groin femoral venous access catheter in place  - Lytic therapy with tPA at 1mg/hr for approximately 20 hours. IR will reassess patient around 4pm on 10/11/20.  - Repeat CT PE 10/11 at 4p    #VIOLA  Uses CPAP at night, but prefers only his mask   - NC for O2 sats >90%   - Wife will bring CPAP mask on 10/11     #HTN   - Holding PTA Lisinopril 20; hydrochlorothiazide 25; Amlodipine 2.5; atenolol 50     #DM2   - Holding PTA diabetes meds (Empagliflozin 25, Semiglutide 0.5mg/wk; Glipizide 5)   - Medium dose sliding scale insulin with q4h BG checks while NPO   - AM Hgb A1c     FEN:  2gm Na   2L water restriction  NPO after midnight    CODE: FULL CODE     Patient to be discussed with staff attending, Dr. Mullen.      Willy Douglass MD  Cardiology Fellow, PGY-6  Pager: [phone]            Subjective/Interval Events     Patient went to IR and had thrombectomy and placement of EKOS catheters.     Upon arrival to ICU, patient reports no CP, SOB, lightheadedness, dizziness. Requesting water.          Objective     BP (!) 141/89   Pulse 95   Temp 99  F (37.2  C)  "(Oral)   Resp 15   Ht 1.803 m (5' 11\")   Wt 128.5 kg (283 lb 4.8 oz)   SpO2 95%   BMI 39.51 kg/m    Temp:  [98.1  F (36.7  C)-99  F (37.2  C)] 99  F (37.2  C)  Pulse:  [] 95  Resp:  [10-20] 15  BP: (127-175)/() 141/89  FiO2 (%):  [60 %-100 %] 85 %  SpO2:  [85 %-97 %] 95 %  Wt Readings from Last 2 Encounters:   10/10/20 128.5 kg (283 lb 4.8 oz)     I/O last 3 completed shifts:  In: 24.92 [I.V.:24.92]  Out: 1100 [Urine:1100]      Gen: No acute distress  HEENT: NC/AT, PERRL, EOM intact, MMM, OP without exudates  PULM/THORAX: Clear to auscultation bilaterally, no rales/rhonchi/wheezes  CV: normal rate, regular rhythm, normal S1 and S2, no murmurs or rubs. No JVD  ABD: Soft, NTND, bowel sounds present, no masses  EXT: WWP. No LE edema, clubbing or cyanosis.  NEURO: CN II-XII grossly intact. A&Ox3            Data:     Recent Results (from the past 24 hour(s))   Glucose by meter    Collection Time: 10/10/20  2:23 AM   Result Value Ref Range    Glucose 178 (H) 70 - 99 mg/dL   CBC with platelets    Collection Time: 10/10/20  2:43 AM   Result Value Ref Range    WBC 13.2 (H) 4.0 - 11.0 10e9/L    RBC Count 5.69 4.4 - 5.9 10e12/L    Hemoglobin 17.2 13.3 - 17.7 g/dL    Hematocrit 51.9 40.0 - 53.0 %    MCV 91 78 - 100 fl    MCH 30.2 26.5 - 33.0 pg    MCHC 33.1 31.5 - 36.5 g/dL    RDW 12.7 10.0 - 15.0 %    Platelet Count 195 150 - 450 10e9/L   Comprehensive metabolic panel    Collection Time: 10/10/20  2:43 AM   Result Value Ref Range    Sodium 136 133 - 144 mmol/L    Potassium 3.2 (L) 3.4 - 5.3 mmol/L    Chloride 102 94 - 109 mmol/L    Carbon Dioxide 23 20 - 32 mmol/L    Anion Gap 11 3 - 14 mmol/L    Glucose 159 (H) 70 - 99 mg/dL    Urea Nitrogen 17 7 - 30 mg/dL    Creatinine 0.87 0.66 - 1.25 mg/dL    GFR Estimate 85 >60 mL/min/[1.73_m2]    GFR Estimate If Black >90 >60 mL/min/[1.73_m2]    Calcium 8.4 (L) 8.5 - 10.1 mg/dL    Bilirubin Total 0.7 0.2 - 1.3 mg/dL    Albumin 3.4 3.4 - 5.0 g/dL    Protein Total 7.1 6.8 " - 8.8 g/dL    Alkaline Phosphatase 92 40 - 150 U/L    ALT 28 0 - 70 U/L    AST 15 0 - 45 U/L   INR    Collection Time: 10/10/20  2:43 AM   Result Value Ref Range    INR 1.07 0.86 - 1.14   Hemoglobin A1c    Collection Time: 10/10/20  2:43 AM   Result Value Ref Range    Hemoglobin A1C 7.8 (H) 0 - 5.6 %   Blood gas arterial    Collection Time: 10/10/20  2:55 AM   Result Value Ref Range    pH Arterial 7.41 7.35 - 7.45 pH    pCO2 Arterial 37 35 - 45 mm Hg    pO2 Arterial 75 (L) 80 - 105 mm Hg    Bicarbonate Arterial 23 21 - 28 mmol/L    Base Deficit Art 0.7 mmol/L    FIO2 10L    Glucose by meter    Collection Time: 10/10/20  5:06 AM   Result Value Ref Range    Glucose 193 (H) 70 - 99 mg/dL   Asymptomatic COVID-19 Virus (Coronavirus) by PCR    Collection Time: 10/10/20  5:20 AM    Specimen: Nasopharyngeal   Result Value Ref Range    COVID-19 Virus PCR to [facility] - Source Nasopharyngeal     COVID-19 Virus PCR to [facility] - Result Not Detected    Heparin 10a Level    Collection Time: 10/10/20  8:50 AM   Result Value Ref Range    Heparin 10A Level 0.94 IU/mL   Glucose by meter    Collection Time: 10/10/20  8:59 AM   Result Value Ref Range    Glucose 143 (H) 70 - 99 mg/dL   Troponin I    Collection Time: 10/10/20 10:57 AM   Result Value Ref Range    Troponin I ES 0.089 (H) 0.000 - 0.045 ug/L   Glucose by meter    Collection Time: 10/10/20 12:01 PM   Result Value Ref Range    Glucose 122 (H) 70 - 99 mg/dL   Heparin 10a Level    Collection Time: 10/10/20  3:41 PM   Result Value Ref Range    Heparin 10A Level 0.88 IU/mL   Troponin I    Collection Time: 10/10/20  3:41 PM   Result Value Ref Range    Troponin I ES 0.098 (H) 0.000 - 0.045 ug/L   Nt probnp inpatient    Collection Time: 10/10/20  3:41 PM   Result Value Ref Range    N-Terminal Pro BNP Inpatient 1,285 (H) 0 - 900 pg/mL   Glucose by meter    Collection Time: 10/10/20  4:18 PM   Result Value Ref Range    Glucose 102 (H) 70 - 99 mg/dL   Activated clotting time POCT     Collection Time: 10/10/20  6:30 PM   Result Value Ref Range    Activated Clot Time 171 (H) 75 - 150 sec   Activated clotting time POCT    Collection Time: 10/10/20  7:16 PM   Result Value Ref Range    Activated Clot Time 205 (H) 75 - 150 sec       Recent Results (from the past 24 hour(s))   XR Chest Port 1 View    Narrative    Chest radiograph 10/10/2020    HISTORY: Outside hospital transfer for saddle PE    COMPARISON: None available    FINDINGS:  Single AP radiograph of the chest. The cardiac silhouette is within  normal limits. The pulmonary vasculature is indistinct with bilateral  perihilar fullness. No pneumothorax. Trace right pleural effusion. No  significant pleural effusion on the left. Mild retrocardiac opacities.  Interstitial and hazy opacities in the right lateral hemithorax.  Streaky right basilar opacities. No acute osseous abnormality.      Impression    IMPRESSION:  1. Indistinct pulmonary vasculature with bilateral perihilar fullness,  which may present pulmonary engorgement due to known/reported sagittal  PE.  2. Hazy and streaky opacities in the right lateral hemithorax, which  may represent pulmonary infarct versus consolidation.  3. Retrocardiac and right basilar streaky opacities, atelectasis  versus consolidation.    I have personally reviewed the examination and initial interpretation  and I agree with the findings.    MIROSLAVA TORRES MD   Echo Complete    Narrative    [id number]  [id number]  [id number]  [id number]^KANWAL^LENNOX           [facility],[place]  Echocardiography Laboratory  [address] [place] [id number]     Name: TRINH REYES  MRN: [id number]  : 1945  Study Date: 10/10/2020 09:12 AM  Age: 75 yrs  Gender: Male  Patient Location: [facility]  Reason For Study: Pulmonary Embolism  Ordering Physician: LENNOX SCHOFIELD  Referring Physician: SELF, REFERRED  Performed By: ELSA Gutierrez     BSA: 2.4 m2  Height: 71 in  Weight: 283 lb  BP:  127/82 mmHg  _____________________________________________________________________________  __        Procedure  Complete Portable Echo Adult. Contrast Optison. Technically difficult study.  Poor acoustic windows. Optison (NDC #5975-4581-36) given intravenously.  Patient was given 6 ml mixture of 3 ml Optison and 6 ml saline. 3 ml wasted.  IV start location R Forearm .  _____________________________________________________________________________  __        Interpretation Summary  Stat TTE for patient with pulmonary embolism  Technically difficult study. Poor acoustic windows.     Global right ventricular function is moderately to severely reduced. Mild to  moderate right ventricular dilation is present. RV free wall hypokinesis with  RV apical hyperkinesis ([place]'s sign) noted.  Left ventricular function is normal.The EF is > 65%. LV regional wall motion  is probably normal.  Limited assessment of the valves.  IVC diameter <2.1 cm collapsing >50% with sniff suggests a normal RA pressure  of 3 mmHg.  There is no prior study for direct comparison.     Results discussed with Dr. Alexander at 1040 hrs.  _____________________________________________________________________________  __        Left Ventricle  Left ventricular function is normal.The EF is > 65%. Left ventricular  diastolic function is not assessable. Regional wall motion is probably normal.     Right Ventricle  Mild to moderate right ventricular dilation is present. Global right  ventricular function is moderately to severely reduced. Right ventricular free  wall akinesia. RV free wall hypokinesis with RV apical hyperkinesis  ([place]'s sign) noted.     Atria  Both atria appear normal.     Mitral Valve  The valve leaflets are not well visualized. Trace mitral insufficiency is  present.        Aortic Valve  The valve leaflets are not well visualized. On Doppler interrogation, there is  no significant stenosis or regurgitation.     Tricuspid Valve  The  valve leaflets are not well visualized. Trace tricuspid insufficiency is  present. Pulmonary artery systolic pressure cannot be assessed.     Pulmonic Valve  The pulmonic valve cannot be assessed.     Vessels  The thoracic aorta cannot be assessed. IVC diameter <2.1 cm collapsing >50%  with sniff suggests a normal RA pressure of 3 mmHg.     Compared to Previous Study  There is no prior study for direct comparison.     _____________________________________________________________________________  __  MMode/2D Measurements & Calculations  Ao root diam: 3.7 cm  LVOT diam: 2.4 cm  LVOT area: 4.5 cm2              _____________________________________________________________________________  __        Report approved by: aMlinda Keys 10/10/2020 10:41 AM                Medications     Current Facility-Administered Medications   Medication     alteplase (ACTIVASE) 15 mg/500 mL EKOS conc (LINE 1)- FOR Unilateral Pulmonary Embolism     glucose gel 15-30 g    Or     dextrose 50 % injection 25-50 mL    Or     glucagon injection 1 mg     fentaNYL (PF) (SUBLIMAZE) injection 25-50 mcg     flumazenil (ROMAZICON) injection 0.2 mg     heparin  drip 25,000 units in 0.45% NaCl 250 mL  ANTICOAGULANT  (see additional administration details for dose)     heparin (porcine) injection 500-6,000 Units     heparin (PRESSURE BAG) 2 Units/mL 0.9% NaCl (1000 mL)     Heparin (Venous Sheath) 25,000 units in 0.45% NaCl 250 mL  - EKOS Catheter Sheath for Unilateral Pulmonary Embolism-LINE 1     heparin bolus from infusion pump     insulin aspart (NovoLOG) injection (RAPID ACTING)     lidocaine (LMX4) cream     lidocaine 1 % 0.1-1 mL     melatonin tablet 1 mg     midazolam (VERSED) injection 0.5-2 mg     naloxone (NARCAN) injection 0.1-0.4 mg     naloxone (NARCAN) injection 0.1-0.4 mg     Patient is already receiving anticoagulation with heparin, enoxaparin (LOVENOX), warfarin (COUMADIN)  or other anticoagulant medication     senna-docusate  (SENOKOT-S/PERICOLACE) 8.6-50 MG per tablet 1 tablet    Or     senna-docusate (SENOKOT-S/PERICOLACE) 8.6-50 MG per tablet 2 tablet     sodium chloride (PF) 0.9% PF flush 10 mL     sodium chloride (PF) 0.9% PF flush 3 mL     sodium chloride (PF) 0.9% PF flush 3 mL     sodium chloride 0.9% EKOS (LINE 1)     sodium chloride 0.9% infusion       Interpretation Summary  Stat TTE for patient with pulmonary embolism  Technically difficult study. Poor acoustic windows.     Global right ventricular function is moderately to severely reduced. Mild to  moderate right ventricular dilation is present. RV free wall hypokinesis with  RV apical hyperkinesis ([place]'s sign) noted.  Left ventricular function is normal.The EF is > 65%. LV regional wall motion  is probably normal.  Limited assessment of the valves.  IVC diameter <2.1 cm collapsing >50% with sniff suggests a normal RA pressure  of 3 mmHg.  There is no prior study for direct comparison.     Results discussed with Dr. Alexander at 1040 hrs.

## 2020-10-11 NOTE — PROGRESS NOTES
Critical Care Cardiology Progress Note  Leander Wong MRN: [id number]  Age: 75 year old, : 1945  Date: 10/10/2020            Assessment and Plan:     Leander Wong is a 75 year old male w/ HTN, DM presenting with AHRF 2/2 saddle PE s/p IR thrombectomy and placement of EKOS thrombolysis catheters.       #Saddle pulmonary embolus  #Distal pulmonary embolus  #Hypoxic respiratory failure  #Acute RV systolic heart failure  Hemodynamically stable on admission. RA at baseline, requiring 4L O2 NC. Elevated D-dimer per transfer note with CT-PE positive for saddle, distal PE's. Unprovoked. Started on Heparin gtt, stable. Now s/p IR thrombectomy, placement of EKOS thrombolysis catheters. RV failure noted on TTE 2/2 submassive PE.   - Per transfer note, images will be pushed.    - EKOS catheter management per IR  - Lytic therapy with tPA at 1mg/hr  - Repeat CT PE today shows residual thrombus in the L main PA and b/l segmental arteries  - Will continue on EKOS overnight  - repeat limited TTE tomorrow   - Heparin ggt  - Right groin femoral venous access catheter in place  - Will need 3-6 months AC for first episode of unprovoked PE with CTEPH follow up at 3 months    #VIOLA  Uses CPAP at night, but prefers only his mask   - NC for O2 sats >90%   - Wife will bring CPAP mask on 10/11     #HTN   - Holding PTA Lisinopril 20; hydrochlorothiazide 25; Amlodipine 2.5; atenolol 50     #DM2   - Holding PTA diabetes meds (Empagliflozin 25, Semiglutide 0.5mg/wk; Glipizide 5)   - Medium dose sliding scale insulin with q4h BG checks while NPO   - AM Hgb A1c     FEN:  2gm Na   2L water restriction  NPO after 3pm    CODE: FULL CODE     Patient to be discussed with staff attending, Dr. Mullen.    Ki Gong MD  Cardiology Fellow, PGY-6  Pager: [phone]            Subjective/Interval Events     Patient went to IR and had thrombectomy and placement of EKOS catheters overnight.    This am, patient reports  "no CP, SOB, lightheadedness, dizziness. He does endorse some low back pain. Also feeling hungry.          Objective     /60 (BP Location: Left arm)   Pulse 68   Temp 98.9  F (37.2  C) (Oral)   Resp 16   Ht 1.803 m (5' 11\")   Wt 126.1 kg (278 lb)   SpO2 93%   BMI 38.77 kg/m    Temp:  [98.1  F (36.7  C)-99.1  F (37.3  C)] 98.9  F (37.2  C)  Pulse:  [] 68  Resp:  [10-20] 16  BP: ()/() 115/60  FiO2 (%):  [60 %-100 %] 70 %  SpO2:  [85 %-97 %] 93 %  Wt Readings from Last 2 Encounters:   10/11/20 126.1 kg (278 lb)     I/O last 3 completed shifts:  In: 1978.23 [P.O.:790; I.V.:1188.23]  Out: 1475 [Urine:1475]      Gen: No acute distress  HEENT: NC/AT, PERRL, EOM intact, MMM, OP without exudates  PULM/THORAX: Clear to auscultation bilaterally, no rales/rhonchi/wheezes  CV: normal rate, regular rhythm, normal S1 and S2, no murmurs or rubs. No JVD  ABD: Soft, NTND, bowel sounds present, no masses  EXT: WWP. No LE edema, clubbing or cyanosis. No bleeding from EKOS cannula site  NEURO: CN II-XII grossly intact. A&Ox3            Data:     Recent Results (from the past 24 hour(s))   Heparin 10a Level    Collection Time: 10/10/20  8:50 AM   Result Value Ref Range    Heparin 10A Level 0.94 IU/mL   Glucose by meter    Collection Time: 10/10/20  8:59 AM   Result Value Ref Range    Glucose 143 (H) 70 - 99 mg/dL   Troponin I    Collection Time: 10/10/20 10:57 AM   Result Value Ref Range    Troponin I ES 0.089 (H) 0.000 - 0.045 ug/L   Glucose by meter    Collection Time: 10/10/20 12:01 PM   Result Value Ref Range    Glucose 122 (H) 70 - 99 mg/dL   Heparin 10a Level    Collection Time: 10/10/20  3:41 PM   Result Value Ref Range    Heparin 10A Level 0.88 IU/mL   Troponin I    Collection Time: 10/10/20  3:41 PM   Result Value Ref Range    Troponin I ES 0.098 (H) 0.000 - 0.045 ug/L   Nt probnp inpatient    Collection Time: 10/10/20  3:41 PM   Result Value Ref Range    N-Terminal Pro BNP Inpatient 1,285 (H) 0 - " 900 pg/mL   Glucose by meter    Collection Time: 10/10/20  4:18 PM   Result Value Ref Range    Glucose 102 (H) 70 - 99 mg/dL   Activated clotting time POCT    Collection Time: 10/10/20  6:30 PM   Result Value Ref Range    Activated Clot Time 171 (H) 75 - 150 sec   Activated clotting time POCT    Collection Time: 10/10/20  7:16 PM   Result Value Ref Range    Activated Clot Time 205 (H) 75 - 150 sec   Glucose by meter    Collection Time: 10/10/20  9:50 PM   Result Value Ref Range    Glucose 140 (H) 70 - 99 mg/dL   Heparin 10a Level    Collection Time: 10/10/20 11:20 PM   Result Value Ref Range    Heparin 10A Level 0.37 IU/mL   Glucose by meter    Collection Time: 10/10/20 11:28 PM   Result Value Ref Range    Glucose 158 (H) 70 - 99 mg/dL   Glucose by meter    Collection Time: 10/11/20  4:16 AM   Result Value Ref Range    Glucose 132 (H) 70 - 99 mg/dL   Heparin 10a Level    Collection Time: 10/11/20  4:37 AM   Result Value Ref Range    Heparin 10A Level <0.10 IU/mL   INR    Collection Time: 10/11/20  4:37 AM   Result Value Ref Range    INR 1.10 0.86 - 1.14   Fibrinogen activity    Collection Time: 10/11/20  4:37 AM   Result Value Ref Range    Fibrinogen 348 200 - 420 mg/dL   CBC with platelets    Collection Time: 10/11/20  4:37 AM   Result Value Ref Range    WBC 11.3 (H) 4.0 - 11.0 10e9/L    RBC Count 4.75 4.4 - 5.9 10e12/L    Hemoglobin 14.6 13.3 - 17.7 g/dL    Hematocrit 44.8 40.0 - 53.0 %    MCV 94 78 - 100 fl    MCH 30.7 26.5 - 33.0 pg    MCHC 32.6 31.5 - 36.5 g/dL    RDW 12.9 10.0 - 15.0 %    Platelet Count 162 150 - 450 10e9/L   Basic metabolic panel    Collection Time: 10/11/20  4:37 AM   Result Value Ref Range    Sodium 137 133 - 144 mmol/L    Potassium 4.0 3.4 - 5.3 mmol/L    Chloride 106 94 - 109 mmol/L    Carbon Dioxide 23 20 - 32 mmol/L    Anion Gap 8 3 - 14 mmol/L    Glucose 140 (H) 70 - 99 mg/dL    Urea Nitrogen 27 7 - 30 mg/dL    Creatinine 0.92 0.66 - 1.25 mg/dL    GFR Estimate 83 >60 mL/min/[1.73_m2]     GFR Estimate If Black >90 >60 mL/min/[1.73_m2]    Calcium 7.8 (L) 8.5 - 10.1 mg/dL   Magnesium    Collection Time: 10/11/20  4:37 AM   Result Value Ref Range    Magnesium 2.3 1.6 - 2.3 mg/dL       Recent Results (from the past 24 hour(s))   XR Chest Port 1 View    Narrative    Chest radiograph 10/10/2020    HISTORY: Outside hospital transfer for saddle PE    COMPARISON: None available    FINDINGS:  Single AP radiograph of the chest. The cardiac silhouette is within  normal limits. The pulmonary vasculature is indistinct with bilateral  perihilar fullness. No pneumothorax. Trace right pleural effusion. No  significant pleural effusion on the left. Mild retrocardiac opacities.  Interstitial and hazy opacities in the right lateral hemithorax.  Streaky right basilar opacities. No acute osseous abnormality.      Impression    IMPRESSION:  1. Indistinct pulmonary vasculature with bilateral perihilar fullness,  which may present pulmonary engorgement due to known/reported sagittal  PE.  2. Hazy and streaky opacities in the right lateral hemithorax, which  may represent pulmonary infarct versus consolidation.  3. Retrocardiac and right basilar streaky opacities, atelectasis  versus consolidation.    I have personally reviewed the examination and initial interpretation  and I agree with the findings.    MIROSLAVA TORRES MD   Echo Complete    Narrative    [id number]  [id number]  [id number]  [id number]^KANWAL^LENNOX           [facility],[place]  Echocardiography Laboratory  [address] [place] [id number]     Name: TRINH REYES  MRN: [id number]  : 1945  Study Date: 10/10/2020 09:12 AM  Age: 75 yrs  Gender: Male  Patient Location: [facility]  Reason For Study: Pulmonary Embolism  Ordering Physician: LENNOX SCHOFIELD  Referring Physician: SELF, REFERRED  Performed By: STACEY Gutierrez     BSA: 2.4 m2  Height: 71 in  Weight: 283 lb  BP: 127/82  mmHg  _____________________________________________________________________________  __        Procedure  Complete Portable Echo Adult. Contrast Optison. Technically difficult study.  Poor acoustic windows. Optison (NDC #6633-4657-11) given intravenously.  Patient was given 6 ml mixture of 3 ml Optison and 6 ml saline. 3 ml wasted.  IV start location R Forearm .  _____________________________________________________________________________  __        Interpretation Summary  Stat TTE for patient with pulmonary embolism  Technically difficult study. Poor acoustic windows.     Global right ventricular function is moderately to severely reduced. Mild to  moderate right ventricular dilation is present. RV free wall hypokinesis with  RV apical hyperkinesis ([place]'s sign) noted.  Left ventricular function is normal.The EF is > 65%. LV regional wall motion  is probably normal.  Limited assessment of the valves.  IVC diameter <2.1 cm collapsing >50% with sniff suggests a normal RA pressure  of 3 mmHg.  There is no prior study for direct comparison.     Results discussed with Dr. Alexander at 1040 hrs.  _____________________________________________________________________________  __        Left Ventricle  Left ventricular function is normal.The EF is > 65%. Left ventricular  diastolic function is not assessable. Regional wall motion is probably normal.     Right Ventricle  Mild to moderate right ventricular dilation is present. Global right  ventricular function is moderately to severely reduced. Right ventricular free  wall akinesia. RV free wall hypokinesis with RV apical hyperkinesis  ([place]'s sign) noted.     Atria  Both atria appear normal.     Mitral Valve  The valve leaflets are not well visualized. Trace mitral insufficiency is  present.        Aortic Valve  The valve leaflets are not well visualized. On Doppler interrogation, there is  no significant stenosis or regurgitation.     Tricuspid Valve  The valve  leaflets are not well visualized. Trace tricuspid insufficiency is  present. Pulmonary artery systolic pressure cannot be assessed.     Pulmonic Valve  The pulmonic valve cannot be assessed.     Vessels  The thoracic aorta cannot be assessed. IVC diameter <2.1 cm collapsing >50%  with sniff suggests a normal RA pressure of 3 mmHg.     Compared to Previous Study  There is no prior study for direct comparison.     _____________________________________________________________________________  __  MMode/2D Measurements & Calculations  Ao root diam: 3.7 cm  LVOT diam: 2.4 cm  LVOT area: 4.5 cm2              _____________________________________________________________________________  __        Report approved by: Malinda Keys 10/10/2020 10:41 AM                Medications     Current Facility-Administered Medications   Medication     acetaminophen (TYLENOL) tablet 975 mg     alteplase (ACTIVASE) 15 mg/500 mL EKOS conc (LINE 1)- FOR Unilateral Pulmonary Embolism     glucose gel 15-30 g    Or     dextrose 50 % injection 25-50 mL    Or     glucagon injection 1 mg     glucose gel 15-30 g    Or     dextrose 50 % injection 25-50 mL    Or     glucagon injection 1 mg     Discontinue Heparin Treatment Management ADULT orders     docusate sodium (COLACE) capsule 100 mg     Heparin (Venous Sheath) 25,000 units in 0.45% NaCl 250 mL  - EKOS Catheter Sheath for Unilateral Pulmonary Embolism-LINE 1     insulin aspart (NovoLOG) injection (RAPID ACTING)     lidocaine (LMX4) cream     lidocaine 1 % 0.1-1 mL     melatonin tablet 1 mg     metoclopramide (REGLAN) tablet 5 mg    Or     metoclopramide (REGLAN) injection 5 mg     naloxone (NARCAN) injection 0.1-0.4 mg     No IM injections     ondansetron (ZOFRAN-ODT) ODT tab 4 mg    Or     ondansetron (ZOFRAN) injection 4 mg     oxyCODONE IR (ROXICODONE) half-tab 2.5-5 mg     Patient is already receiving anticoagulation with heparin, enoxaparin (LOVENOX), warfarin (COUMADIN)  or other  anticoagulant medication     prochlorperazine (COMPAZINE) injection 5 mg    Or     prochlorperazine (COMPAZINE) tablet 5 mg    Or     prochlorperazine (COMPAZINE) suppository 12.5 mg     senna-docusate (SENOKOT-S/PERICOLACE) 8.6-50 MG per tablet 1 tablet    Or     senna-docusate (SENOKOT-S/PERICOLACE) 8.6-50 MG per tablet 2 tablet     sodium chloride (PF) 0.9% PF flush 10 mL     sodium chloride (PF) 0.9% PF flush 3 mL     sodium chloride (PF) 0.9% PF flush 3 mL     sodium chloride 0.9% EKOS (LINE 1)     sodium chloride 0.9% infusion     sodium chloride 0.9% infusion       Interpretation Summary  Stat TTE for patient with pulmonary embolism  Technically difficult study. Poor acoustic windows.     Global right ventricular function is moderately to severely reduced. Mild to  moderate right ventricular dilation is present. RV free wall hypokinesis with  RV apical hyperkinesis ([place]'s sign) noted.  Left ventricular function is normal.The EF is > 65%. LV regional wall motion  is probably normal.  Limited assessment of the valves.  IVC diameter <2.1 cm collapsing >50% with sniff suggests a normal RA pressure  of 3 mmHg.  There is no prior study for direct comparison.     Results discussed with Dr. Alexander at 1040 hrs.

## 2020-10-11 NOTE — PROGRESS NOTES
Admitted/transferred from: patient accepted in transfer from  via cart at approximately 2100 s/p catheter placement with EKOS catheter and lytics for saddle PE  Reason for admission/transfer: continued monitoring with infusion of lytics and EKOS catheter, respiratory status  2 RN skin assessment: completed by Clarice LOPEZ and Liam ORLANDO RN  Result of skin assessment and interventions/actions: Right groin with catheter infusion of lytics and heparin and EKOS catheter.  PIV x2 right forearm.  Gongora. Few noted scab on b/l UE and LE. Pink rash on left flank  Height, weight, drug calc weight: done  Patient belongings (see Flowsheet)  MDRO education added to care plan: NA  ?

## 2020-10-11 NOTE — PLAN OF CARE
Major Shift Events:  VSS, NSR. Oximyzer at 8L, lungs clear/diminished. Pt advanced to reg diet, NPO overnight for procedure. Gongora in place with good output. Pt remains on bedrest, with R groin line. EKOS machine in place running Alteplase and Heparin per order. R PIV infusing NS at 175ml. CT completed this afternoon. Oxy given for back pain.    Plan: Continue EKOS with Alteplase and Heparin overnight, IR in AM for removal of groin lines. Clear liquid diet at midnight, NPO at 9AM (at least 2hrs prior to procedure.)    For vital signs and complete assessments, please see documentation flowsheets.

## 2020-10-11 NOTE — PROCEDURES
[facility]     Procedure: Bilateral Pulmonary Artery Angiogram with Intervention and Lytic Therapy    Date/Time: 10/10/2020 8:33 PM  Performed by: Dony Flores MD  Authorized by: Dony Flores MD   IR Fellow Physician:  Radiology Resident Physician: Dr. Harmeet Sanchez      UNIVERSAL PROTOCOL   Site Marked: NA  Prior Images Obtained and Reviewed:  Yes  Required items: Required blood products, implants, devices and special equipment available    Patient identity confirmed:  Verbally with patient, arm band, provided demographic data and hospital-assigned identification number  Patient was reevaluated immediately before administering moderate or deep sedation or anesthesia  Confirmation Checklist:  Patient's identity using two indicators, relevant allergies, procedure was appropriate and matched the consent or emergent situation and correct equipment/implants were available  Time out: Immediately prior to the procedure a time out was called    Universal Protocol: the Joint Commission Universal Protocol was followed    Preparation: Patient was prepped and draped in usual sterile fashion           ANESTHESIA    Anesthesia: Local infiltration  Local Anesthetic:  Lidocaine 1% without epinephrine      SEDATION    Patient Sedated: Yes    Sedation Type:  Moderate (conscious) sedation  Sedation:  Fentanyl and midazolam  Vital signs: Vital signs monitored during sedation    See dictated procedure note for full details.  Findings: Pre-procedure pressures:  RA: 11  RV: 56/8 mean 26  MPA: 62/27 mean 38  LPA: 61/24 mean 39    Large amount of clot was removed from the right pulmonary artery with suction thrombectomy. Unable to remove large thrombus in the central/left pulmonary artery. Lytic therapy was initiated. Patient oxygen saturation declined to the mid 80%'s during the case and was placed on BiPAP.     Specimens: none    Complications: None    Condition:  Stable    Plan: -Will admit to [facility] ICU bed.   -Right groin femoral venous access catheter in place  -Lytic therapy with tPA at 1mg/hr for approximately 20 hours. We will reassess patient around 4pm on 10/11/20.  -Repeat CT PE tomorrow at 4pm.    PROCEDURE   Patient Tolerance:  Patient tolerated the procedure well with no immediate complications    Length of time physician/provider present for 1:1 monitoring during sedation: 135

## 2020-10-12 ENCOUNTER — APPOINTMENT (OUTPATIENT)
Dept: CARDIOLOGY | Facility: CLINIC | Age: 75
DRG: 270 | End: 2020-10-12
Attending: STUDENT IN AN ORGANIZED HEALTH CARE EDUCATION/TRAINING PROGRAM
Payer: COMMERCIAL

## 2020-10-12 ENCOUNTER — APPOINTMENT (OUTPATIENT)
Dept: INTERVENTIONAL RADIOLOGY/VASCULAR | Facility: CLINIC | Age: 75
DRG: 270 | End: 2020-10-12
Attending: RADIOLOGY
Payer: COMMERCIAL

## 2020-10-12 LAB
ERYTHROCYTE [DISTWIDTH] IN BLOOD BY AUTOMATED COUNT: 12.6 % (ref 10–15)
ERYTHROCYTE [DISTWIDTH] IN BLOOD BY AUTOMATED COUNT: 12.6 % (ref 10–15)
ERYTHROCYTE [DISTWIDTH] IN BLOOD BY AUTOMATED COUNT: 12.7 % (ref 10–15)
FIBRINOGEN PPP-MCNC: 309 MG/DL (ref 200–420)
FIBRINOGEN PPP-MCNC: 412 MG/DL (ref 200–420)
GLUCOSE BLDC GLUCOMTR-MCNC: 106 MG/DL (ref 70–99)
GLUCOSE BLDC GLUCOMTR-MCNC: 118 MG/DL (ref 70–99)
GLUCOSE BLDC GLUCOMTR-MCNC: 131 MG/DL (ref 70–99)
GLUCOSE BLDC GLUCOMTR-MCNC: 132 MG/DL (ref 70–99)
GLUCOSE BLDC GLUCOMTR-MCNC: 149 MG/DL (ref 70–99)
GLUCOSE BLDC GLUCOMTR-MCNC: 201 MG/DL (ref 70–99)
HCT VFR BLD AUTO: 42.4 % (ref 40–53)
HCT VFR BLD AUTO: 42.9 % (ref 40–53)
HCT VFR BLD AUTO: 47.1 % (ref 40–53)
HGB BLD-MCNC: 13.6 G/DL (ref 13.3–17.7)
HGB BLD-MCNC: 13.9 G/DL (ref 13.3–17.7)
HGB BLD-MCNC: 15.2 G/DL (ref 13.3–17.7)
INR PPP: 1.14 (ref 0.86–1.14)
LMWH PPP CHRO-ACNC: 0.24 IU/ML
LMWH PPP CHRO-ACNC: 0.48 IU/ML
LMWH PPP CHRO-ACNC: <0.1 IU/ML
MCH RBC QN AUTO: 30.1 PG (ref 26.5–33)
MCH RBC QN AUTO: 30.6 PG (ref 26.5–33)
MCH RBC QN AUTO: 30.6 PG (ref 26.5–33)
MCHC RBC AUTO-ENTMCNC: 31.7 G/DL (ref 31.5–36.5)
MCHC RBC AUTO-ENTMCNC: 32.3 G/DL (ref 31.5–36.5)
MCHC RBC AUTO-ENTMCNC: 32.8 G/DL (ref 31.5–36.5)
MCV RBC AUTO: 93 FL (ref 78–100)
MCV RBC AUTO: 95 FL (ref 78–100)
MCV RBC AUTO: 95 FL (ref 78–100)
PLATELET # BLD AUTO: 117 10E9/L (ref 150–450)
PLATELET # BLD AUTO: 123 10E9/L (ref 150–450)
PLATELET # BLD AUTO: 127 10E9/L (ref 150–450)
RBC # BLD AUTO: 4.52 10E12/L (ref 4.4–5.9)
RBC # BLD AUTO: 4.54 10E12/L (ref 4.4–5.9)
RBC # BLD AUTO: 4.96 10E12/L (ref 4.4–5.9)
WBC # BLD AUTO: 10.1 10E9/L (ref 4–11)
WBC # BLD AUTO: 10.4 10E9/L (ref 4–11)
WBC # BLD AUTO: 10.5 10E9/L (ref 4–11)

## 2020-10-12 PROCEDURE — 250N000013 HC RX MED GY IP 250 OP 250 PS 637: Performed by: STUDENT IN AN ORGANIZED HEALTH CARE EDUCATION/TRAINING PROGRAM

## 2020-10-12 PROCEDURE — 999N000208 ECHOCARDIOGRAM LIMITED

## 2020-10-12 PROCEDURE — 36415 COLL VENOUS BLD VENIPUNCTURE: CPT | Performed by: RADIOLOGY

## 2020-10-12 PROCEDURE — 250N000011 HC RX IP 250 OP 636: Performed by: RADIOLOGY

## 2020-10-12 PROCEDURE — 255N000002 HC RX 255 OP 636: Performed by: RADIOLOGY

## 2020-10-12 PROCEDURE — 272N000686 HC HEADBAND, CERIBELL EEG

## 2020-10-12 PROCEDURE — 250N000011 HC RX IP 250 OP 636: Performed by: STUDENT IN AN ORGANIZED HEALTH CARE EDUCATION/TRAINING PROGRAM

## 2020-10-12 PROCEDURE — 250N000013 HC RX MED GY IP 250 OP 250 PS 637: Performed by: NURSE PRACTITIONER

## 2020-10-12 PROCEDURE — 93325 DOPPLER ECHO COLOR FLOW MAPG: CPT | Mod: 26 | Performed by: STUDENT IN AN ORGANIZED HEALTH CARE EDUCATION/TRAINING PROGRAM

## 2020-10-12 PROCEDURE — C1769 GUIDE WIRE: HCPCS

## 2020-10-12 PROCEDURE — 99153 MOD SED SAME PHYS/QHP EA: CPT

## 2020-10-12 PROCEDURE — 37214 CESSJ THERAPY CATH REMOVAL: CPT

## 2020-10-12 PROCEDURE — 85520 HEPARIN ASSAY: CPT | Performed by: RADIOLOGY

## 2020-10-12 PROCEDURE — 99291 CRITICAL CARE FIRST HOUR: CPT | Mod: 25 | Performed by: INTERNAL MEDICINE

## 2020-10-12 PROCEDURE — 93308 TTE F-UP OR LMTD: CPT | Mod: 26 | Performed by: STUDENT IN AN ORGANIZED HEALTH CARE EDUCATION/TRAINING PROGRAM

## 2020-10-12 PROCEDURE — 85027 COMPLETE CBC AUTOMATED: CPT | Performed by: RADIOLOGY

## 2020-10-12 PROCEDURE — 258N000003 HC RX IP 258 OP 636: Performed by: RADIOLOGY

## 2020-10-12 PROCEDURE — 99152 MOD SED SAME PHYS/QHP 5/>YRS: CPT

## 2020-10-12 PROCEDURE — 85384 FIBRINOGEN ACTIVITY: CPT | Performed by: RADIOLOGY

## 2020-10-12 PROCEDURE — 93321 DOPPLER ECHO F-UP/LMTD STD: CPT | Mod: 26 | Performed by: STUDENT IN AN ORGANIZED HEALTH CARE EDUCATION/TRAINING PROGRAM

## 2020-10-12 PROCEDURE — 85610 PROTHROMBIN TIME: CPT | Performed by: RADIOLOGY

## 2020-10-12 PROCEDURE — 255N000002 HC RX 255 OP 636: Performed by: INTERNAL MEDICINE

## 2020-10-12 PROCEDURE — 272N000147 HC KIT CR7

## 2020-10-12 PROCEDURE — 999N001017 HC STATISTIC GLUCOSE BY METER IP

## 2020-10-12 PROCEDURE — 120N000002 HC R&B MED SURG/OB UMMC

## 2020-10-12 PROCEDURE — 258N000003 HC RX IP 258 OP 636: Performed by: STUDENT IN AN ORGANIZED HEALTH CARE EDUCATION/TRAINING PROGRAM

## 2020-10-12 RX ORDER — NALOXONE HYDROCHLORIDE 0.4 MG/ML
.1-.4 INJECTION, SOLUTION INTRAMUSCULAR; INTRAVENOUS; SUBCUTANEOUS
Status: DISCONTINUED | OUTPATIENT
Start: 2020-10-12 | End: 2020-10-12

## 2020-10-12 RX ORDER — METOCLOPRAMIDE 5 MG/1
5 TABLET ORAL EVERY 6 HOURS PRN
Status: DISCONTINUED | OUTPATIENT
Start: 2020-10-12 | End: 2020-10-12

## 2020-10-12 RX ORDER — CALCIUM CARBONATE 500 MG/1
500-1000 TABLET, CHEWABLE ORAL 2 TIMES DAILY PRN
Status: DISCONTINUED | OUTPATIENT
Start: 2020-10-12 | End: 2020-10-14 | Stop reason: HOSPADM

## 2020-10-12 RX ORDER — LISINOPRIL 5 MG/1
5 TABLET ORAL DAILY
Status: DISCONTINUED | OUTPATIENT
Start: 2020-10-12 | End: 2020-10-13

## 2020-10-12 RX ORDER — SODIUM CHLORIDE 9 MG/ML
INJECTION, SOLUTION INTRAVENOUS CONTINUOUS
Status: DISCONTINUED | OUTPATIENT
Start: 2020-10-12 | End: 2020-10-13

## 2020-10-12 RX ORDER — METOCLOPRAMIDE HYDROCHLORIDE 5 MG/ML
5 INJECTION INTRAMUSCULAR; INTRAVENOUS EVERY 6 HOURS PRN
Status: DISCONTINUED | OUTPATIENT
Start: 2020-10-12 | End: 2020-10-12

## 2020-10-12 RX ORDER — NICOTINE POLACRILEX 4 MG
15-30 LOZENGE BUCCAL
Status: DISCONTINUED | OUTPATIENT
Start: 2020-10-12 | End: 2020-10-12

## 2020-10-12 RX ORDER — PROCHLORPERAZINE MALEATE 5 MG
5 TABLET ORAL EVERY 6 HOURS PRN
Status: DISCONTINUED | OUTPATIENT
Start: 2020-10-12 | End: 2020-10-12

## 2020-10-12 RX ORDER — PROCHLORPERAZINE 25 MG
12.5 SUPPOSITORY, RECTAL RECTAL EVERY 12 HOURS PRN
Status: DISCONTINUED | OUTPATIENT
Start: 2020-10-12 | End: 2020-10-12

## 2020-10-12 RX ORDER — ONDANSETRON 2 MG/ML
4 INJECTION INTRAMUSCULAR; INTRAVENOUS EVERY 6 HOURS PRN
Status: DISCONTINUED | OUTPATIENT
Start: 2020-10-12 | End: 2020-10-12

## 2020-10-12 RX ORDER — TIZANIDINE 2 MG/1
2 TABLET ORAL EVERY 6 HOURS PRN
Status: DISCONTINUED | OUTPATIENT
Start: 2020-10-12 | End: 2020-10-13

## 2020-10-12 RX ORDER — IODIXANOL 320 MG/ML
150 INJECTION, SOLUTION INTRAVASCULAR ONCE
Status: COMPLETED | OUTPATIENT
Start: 2020-10-12 | End: 2020-10-12

## 2020-10-12 RX ORDER — FENTANYL CITRATE 50 UG/ML
25-50 INJECTION, SOLUTION INTRAMUSCULAR; INTRAVENOUS EVERY 5 MIN PRN
Status: DISCONTINUED | OUTPATIENT
Start: 2020-10-12 | End: 2020-10-12

## 2020-10-12 RX ORDER — ACETAMINOPHEN 325 MG/1
650 TABLET ORAL EVERY 4 HOURS PRN
Status: DISCONTINUED | OUTPATIENT
Start: 2020-10-12 | End: 2020-10-14 | Stop reason: HOSPADM

## 2020-10-12 RX ORDER — FLUMAZENIL 0.1 MG/ML
0.2 INJECTION, SOLUTION INTRAVENOUS
Status: DISCONTINUED | OUTPATIENT
Start: 2020-10-12 | End: 2020-10-12

## 2020-10-12 RX ORDER — HEPARIN SODIUM 10000 [USP'U]/100ML
0-3500 INJECTION, SOLUTION INTRAVENOUS CONTINUOUS
Status: DISCONTINUED | OUTPATIENT
Start: 2020-10-12 | End: 2020-10-13

## 2020-10-12 RX ORDER — AMLODIPINE BESYLATE 2.5 MG/1
2.5 TABLET ORAL DAILY
Status: DISCONTINUED | OUTPATIENT
Start: 2020-10-12 | End: 2020-10-14

## 2020-10-12 RX ORDER — DEXTROSE MONOHYDRATE 25 G/50ML
25-50 INJECTION, SOLUTION INTRAVENOUS
Status: DISCONTINUED | OUTPATIENT
Start: 2020-10-12 | End: 2020-10-12

## 2020-10-12 RX ORDER — DOCUSATE SODIUM 100 MG/1
100 CAPSULE, LIQUID FILLED ORAL 2 TIMES DAILY
Status: DISCONTINUED | OUTPATIENT
Start: 2020-10-12 | End: 2020-10-12

## 2020-10-12 RX ORDER — ONDANSETRON 4 MG/1
4 TABLET, ORALLY DISINTEGRATING ORAL EVERY 6 HOURS PRN
Status: DISCONTINUED | OUTPATIENT
Start: 2020-10-12 | End: 2020-10-12

## 2020-10-12 RX ADMIN — LISINOPRIL 5 MG: 5 TABLET ORAL at 15:58

## 2020-10-12 RX ADMIN — FENTANYL CITRATE 50 MCG: 50 INJECTION, SOLUTION INTRAMUSCULAR; INTRAVENOUS at 08:59

## 2020-10-12 RX ADMIN — INSULIN ASPART 2 UNITS: 100 INJECTION, SOLUTION INTRAVENOUS; SUBCUTANEOUS at 19:43

## 2020-10-12 RX ADMIN — TIZANIDINE 2 MG: 2 TABLET ORAL at 02:09

## 2020-10-12 RX ADMIN — MIDAZOLAM 1 MG: 1 INJECTION INTRAMUSCULAR; INTRAVENOUS at 08:45

## 2020-10-12 RX ADMIN — INSULIN ASPART 1 UNITS: 100 INJECTION, SOLUTION INTRAVENOUS; SUBCUTANEOUS at 13:16

## 2020-10-12 RX ADMIN — DOCUSATE SODIUM 50 MG AND SENNOSIDES 8.6 MG 1 TABLET: 8.6; 5 TABLET, FILM COATED ORAL at 19:38

## 2020-10-12 RX ADMIN — FENTANYL CITRATE 50 MCG: 50 INJECTION, SOLUTION INTRAMUSCULAR; INTRAVENOUS at 09:15

## 2020-10-12 RX ADMIN — IODIXANOL 42 ML: 320 INJECTION, SOLUTION INTRAVASCULAR at 09:05

## 2020-10-12 RX ADMIN — Medication 2.5 MG: at 03:56

## 2020-10-12 RX ADMIN — FENTANYL CITRATE 50 MCG: 50 INJECTION, SOLUTION INTRAMUSCULAR; INTRAVENOUS at 08:45

## 2020-10-12 RX ADMIN — Medication 1 MG: at 19:39

## 2020-10-12 RX ADMIN — ALTEPLASE 1 MG/HR: 2.2 INJECTION, POWDER, LYOPHILIZED, FOR SOLUTION INTRAVENOUS at 04:09

## 2020-10-12 RX ADMIN — HEPARIN SODIUM 2250 UNITS/HR: 10000 INJECTION, SOLUTION INTRAVENOUS at 22:54

## 2020-10-12 RX ADMIN — ACETAMINOPHEN 650 MG: 325 TABLET, FILM COATED ORAL at 02:09

## 2020-10-12 RX ADMIN — Medication 2.5 MG: at 11:58

## 2020-10-12 RX ADMIN — MIDAZOLAM 1 MG: 1 INJECTION INTRAMUSCULAR; INTRAVENOUS at 09:16

## 2020-10-12 RX ADMIN — HEPARIN SODIUM 2100 UNITS/HR: 10000 INJECTION, SOLUTION INTRAVENOUS at 09:20

## 2020-10-12 RX ADMIN — AMLODIPINE BESYLATE 2.5 MG: 2.5 TABLET ORAL at 15:57

## 2020-10-12 RX ADMIN — MIDAZOLAM 1 MG: 1 INJECTION INTRAMUSCULAR; INTRAVENOUS at 08:59

## 2020-10-12 RX ADMIN — ACETAMINOPHEN 325 MG: 325 TABLET, FILM COATED ORAL at 06:13

## 2020-10-12 RX ADMIN — CALCIUM CARBONATE (ANTACID) CHEW TAB 500 MG 1000 MG: 500 CHEW TAB at 02:09

## 2020-10-12 RX ADMIN — SODIUM CHLORIDE: 9 INJECTION, SOLUTION INTRAVENOUS at 10:32

## 2020-10-12 RX ADMIN — HUMAN ALBUMIN MICROSPHERES AND PERFLUTREN 4 ML: 10; .22 INJECTION, SOLUTION INTRAVENOUS at 08:00

## 2020-10-12 RX ADMIN — DOCUSATE SODIUM 50 MG AND SENNOSIDES 8.6 MG 1 TABLET: 8.6; 5 TABLET, FILM COATED ORAL at 10:32

## 2020-10-12 RX ADMIN — HEPARIN SODIUM 2250 UNITS/HR: 10000 INJECTION, SOLUTION INTRAVENOUS at 12:36

## 2020-10-12 RX ADMIN — ACETAMINOPHEN 650 MG: 325 TABLET, FILM COATED ORAL at 11:58

## 2020-10-12 RX ADMIN — Medication 2.5 MG: at 06:12

## 2020-10-12 ASSESSMENT — ACTIVITIES OF DAILY LIVING (ADL)
ADLS_ACUITY_SCORE: 17

## 2020-10-12 NOTE — PRE-PROCEDURE
GENERAL PRE-PROCEDURE:   Procedure:  Pulmonary Angiogram  Date/Time:  10/12/2020 9:13 AM    Verbal consent obtained?: Yes    Written consent obtained?: Yes    Risks and benefits: Risks, benefits and alternatives were discussed    Consent given by:  Patient  Patient states understanding of procedure being performed: Yes    Patient's understanding of procedure matches consent: Yes    Procedure consent matches procedure scheduled: Yes    Expected level of sedation:  Moderate  Appropriately NPO:  Yes  ASA Class:  Class 3- Severe systemic disease, definite functional limitations  Mallampati  :  Grade 2- soft palate, base of uvula, tonsillar pillars, and portion of posterior pharyngeal wall visible  Lungs:  Lungs clear with good breath sounds bilaterally  Heart:  Normal heart sounds and rate  History & Physical reviewed:  History and physical reviewed and no updates needed  Statement of review:  I have reviewed the lab findings, diagnostic data, medications, and the plan for sedation

## 2020-10-12 NOTE — PROGRESS NOTES
Critical Care Cardiology   Progress Note  Leander Wong MRN: [id number]  Age: 75 year old, : 1945  Date: 10/12/2020      History of Present Illness   Leander Wong is a 75 year old male w/ HTN, HLD, and DM presenting with AHRF 2/2 saddle PE s/p IR thrombectomy and placement of EKOS thrombolysis catheters.    Subjective/Interval Events   SpO2 stable overnight on 10L via oxymizer. CT PE yesterday showed non-occlusive embolism at LPA, segmental occlusive thrombi at left upper and lower lobes, non-occlusive thrombi otherwise. Ongoing heparin/tPA via EKOS.     Assessment and Plan   Today's plan:  -repeat limited TTE   -to IR for repeat angiogram and EKOS removal   -wean O2 as able   -transfer to floor   ------------------  Neurology:   Alert and oriented. Pain in lower back while on bedrest. Oozing from RFV site overnight but reportedly no hematoma.   Imaging/procedures:   N/A  Sedation/paralytics:   N/A  Plan:   -PRN pain medications and muscle relaxants for back pain     Cardiovascular:   Saddle pulmonary embolus  Distal pulmonary embolus  Hypoxic respiratory failure  Acute RV systolic heart failure  HTN  Hemodynamically stable on admission. RA at baseline, requiring 4L O2 NC. Elevated D-dimer per transfer note with CT-PE positive for saddle and distal PEs. Unprovoked. Started on heparin gtt; HDS. Now s/p IR thrombectomy, placement of EKOS thrombolysis catheters. RV failure noted on TTE 2/2 submassive PE.  -EKOS catheter management per IR w/ tPA lytic therapy at 1 mg/hr  -repeat CT PE 10/11 showed non-occlusive embolism at LPA, segmental occlusive thrombi at left upper and lower lobes, non-occlusive thrombi otherwise  -to IR today for repeat angiogram and likely EKOS removal   -repeat limited echo today to assess RV   -continue systemic high-intensity heparin gtt post-EKOS removal   -plan for 3-6 months AC for first episode of unprovoked PE with CTEPH follow up at 3 months  -resume home BP  "meds    Pulmonary:  Hypoxic respiratory failure   VIOLA  Vent settings:   N/A  Imaging/procedures:   CXR 10/10: Right-sided opacities. Lines in stable position.   Plan:   -requiring supplemental O2 w/ 10L via oxymizer   -wean O2 as able   -consider scheduled duonebs if signs of lung dz, currently PRN     GI and Nutrition:   No known hx.   Imaging/procedures:   N/A  Plan:   -tolerating PO intake  -continue bowel regimen      Renal, Fluid, and Electrolytes:   No known medical hx. Creatinine 0.92, BUN 27. Making adequate urine.   Plan:   -monitor urine output  -maintain K>3.8 and Mg>2    Infectious Disease:   WBC 10.4; afebrile. No signs of infection.   Plan:   -monitor for signs of infection    Hematology and Oncology:   Saddle pulmonary embolus  Distal pulmonary embolus  No known clotting hx. Recent 2.5 hour car rides and 5 hour flights to/from [place] twice this summer. Hgb 13.6, plt 123. Receiving heparin and tPA via EKOS for PE.   Plan:   -to IR for repeat angiogram and possible EKOS removal   -systemic high-intensity heparin gtt post-procedure   -start NOAC tomorrow, continue x 3-6 months    -CTEPH clinic follow up in 3 months     Endocrine:   DMII  BG elevated in setting of critical illness.  Plan:   -sliding scale insulin     Lines:   R femoral venous line October 10, 2020  Restraint: not needed    Current lines are required for patient management      Family update by me today: Yes     Code Status: Full code     The pt was discussed and evaluated with Dr. Moreno, attending physician, who agrees with the assessment and plan above.     Indira Millan, AMANDA APRN CNP   Critical Care Cardiology   [phone]     Objective     /86   Pulse 86   Temp 98.1  F (36.7  C) (Oral)   Resp 16   Ht 1.803 m (5' 11\")   Wt 126.1 kg (278 lb)   SpO2 93%   BMI 38.77 kg/m    Temp:  [98.1  F (36.7  C)-99.4  F (37.4  C)] 98.1  F (36.7  C)  Pulse:  [65-92] 86  Resp:  [14-22] 16  BP: (116-151)/(57-89) 134/86  SpO2:  [90 %-96 " %] 93 %  Wt Readings from Last 2 Encounters:   10/11/20 126.1 kg (278 lb)     I/O last 3 completed shifts:  In: 3294.32 [P.O.:80; I.V.:3214.32]  Out: 1750 [Urine:1750]    GENERAL APPEARANCE: Alert and oriented. NAD.  HEENT: No icterus, PERRL 2 mm.  CARDIOVASCULAR: regular rate and rhythm, normal S1/S2, no S3/S4 and no murmur, click, or rub. Normal PMI.   RESP: Clear bilaterally.    GASTRO: Abdomen soft, bowel sounds hypoactive but present.  GENITOURINARY: Deferred.  EXTREMITIES: Warm, no edema.   NEURO: Alert and oriented. Pupils equal and reactive, 2 mm.   INTEGUMENTARY: No rashes. RFV line site oozing.   LINES/TUBES/DRAINS: RFV      Data     Vent Settings:  Resp: 16 SpO2: 93 % O2 Device: Oxymizer cannula Oxygen Delivery: 4 LPM    Arterial Blood Gas:   Recent Labs   Lab 10/10/20  0255   PH 7.41   PCO2 37   PO2 75*   HCO3 23   O2PER 10L       Vitals:    10/10/20 0200 10/10/20 2200 10/11/20 0000   Weight: 128.5 kg (283 lb 4.8 oz) 126.1 kg (278 lb) 126.1 kg (278 lb)   I/O last 3 completed shifts:  In: 3294.32 [P.O.:80; I.V.:3214.32]  Out: 1750 [Urine:1750]  Recent Labs   Lab 10/11/20  0437 10/10/20  0243    136   POTASSIUM 4.0 3.2*   CHLORIDE 106 102   CO2 23 23   ANIONGAP 8 11   * 159*   BUN 27 17   CR 0.92 0.87   SUN 7.8* 8.4*     No components found for: URINE   Recent Labs   Lab 10/10/20  0243   AST 15   ALT 28   BILITOTAL 0.7   ALBUMIN 3.4   PROTTOTAL 7.1   ALKPHOS 92     Temp: 98.1  F (36.7  C) Temp src: OralTemp  Min: 98.1  F (36.7  C)  Max: 99.4  F (37.4  C)   Recent Labs   Lab 10/12/20  1154 10/12/20  0329 10/11/20  1636 10/11/20  0437 10/10/20  0243   WBC 10.1 10.4 9.1 11.3* 13.2*   HGB 13.9 13.6 13.9 14.6 17.2   HCT 42.4 42.9 43.3 44.8 51.9   MCV 93 95 94 94 91   RDW 12.6 12.7 13.0 12.9 12.7   * 123* 130* 162 195     Recent Labs   Lab 10/12/20  0329 10/11/20  0437 10/10/20  0243   INR 1.14 1.10 1.07     Recent Labs   Lab 10/11/20  0437 10/10/20  0243   * 159*       All imaging  personally reviewed:  Recent Results (from the past 24 hour(s))   CT Chest Pulmonary Embolism w Contrast    Narrative    EXAMINATION: CT CHEST PULMONARY EMBOLISM W CONTRAST, 10/11/2020 12:45  PM    CLINICAL HISTORY: PE suspected, high pretest prob; Hx of saddle PE s/p  PA thrombectomy and lytic therapy. Evaluate extent of clot burden    COMPARISON: Pulmonary angiogram 10/10/2020 Chest radiograph  10/10/2020.    TECHNIQUE: CT imaging obtained through the chest with contrast.  Coronal and axial MIP reformatted images obtained. Three-dimensional  (3D) post-processed angiographic images were reconstructed, archived  to PACS and used in interpretation of this study.     CONTRAST: 77 ml isovue 370 IV    FINDINGS:    Pulmonary vasculature: Opacification of the pulmonary arteries is  adequate. Bilateral pulmonary emboli. There is a large pulmonary  embolism at the bifurcation of the left main pulmonary artery.  Nonocclusive thrombus extends into the left lower lobe pulmonary  artery. Occlusive thrombi extend into segmental branches of the left  upper and left lower lobes. Segmental nonocclusive emboli involving  branches of the right upper and lower lobes. The main pulmonary is  enlarged measuring 3.6 cm in diameter.    Heart: Heart is borderline enlarged. Maximum diameter of the right  ventricle measuring 5.2 cm. Maximum diameter of the left ventricle  measuring 3.8 cm. Trace pericardial effusion. No reflux of contrast  into the IVC..    Lungs: Small bilateral pleural effusions with adjacent compressive  atelectasis. Additional area of atelectasis along the right lateral  chest wall and the right minor fissure.    Thyroid: Enlarged and heterogeneous in appearance. No discrete  nodules..    Mediastinum:  The central tracheobronchial tree is patent. Airway  thickening in the lower lobes..  A small sliding-type hiatal hernia  and mildly patulous distal esophagus.. Multiple subcentimeter  mediastinal lymph nodes..     Bones  and soft tissues: Degenerative changes of the spine. No  suspicious osseous lesion.    Upper abdomen: Limited.No acute findings in the upper abdomen.  Pericatheter and progressing up the IVC, progressing into the right  heart and into the left main branch pulmonary artery.      Impression    IMPRESSION:   1. Persistent pulmonary emboli, including a nonocclusive embolism at  the bifurcation of the left pulmonary artery. There are segmental  occlusive thrombi in the left upper and left lower lobes, and  segmental nonocclusive thrombi in the right upper and right lower  lobes.  2. Cardiomegaly with right ventricular dilatation, suggestive of right  heart strain.  3. Small bilateral pleural effusions with areas of associated  atelectasis.    I have personally reviewed the examination and initial interpretation  and I agree with the findings.    MIROSLAVA TORRES MD   Echocardiogram Limited    Narrative    [id number]  [id number]  [id number]  [id number]^JAMARI^MEHUL           [facility],[place]  Echocardiography Laboratory  [address] [place] [id number]     Name: TRINH REYES  MRN: [id number]  : 1945  Study Date: 10/12/2020 07:06 AM  Age: 75 yrs  Gender: Male  Patient Location: [facility]  Reason For Study: Pulmonary Embolism  Ordering Physician: MEHUL KAUFFMAN  Referring Physician: SELF, REFERRED  Performed By: Rg Polk     BSA: 2.4 m2  Height: 71 in  Weight: 278 lb  HR: 75  BP: 150/81 mmHg  _____________________________________________________________________________  __        Procedure  Limited Portable Echo Adult. Optison (NDC #7756-3743-04) given intravenously.  Patient was given 4 ml mixture of 3 ml Optison and 6 ml saline. 5 ml wasted.  _____________________________________________________________________________  __        Interpretation Summary  Technically difficult study due to poor acoustic windows and interference from  EKOS catheter.  Limited study to evaluate RV  function.     Grossly normal LV function on limited views.  Global right ventricular function is mildly to moderately reduced. Mild right  ventricular dilation is present.  Trivial pericardial effusion is present.  Chamber compression is not present; there is no evidence for tamponade.  This study was compared with the study from 10/10/2020. Improvement in RV  function and RV dilation.  _____________________________________________________________________________  __        Left Ventricle  LV function grossly normal. Left ventricular diastolic function is not  assessable.     Right Ventricle  Mild right ventricular dilation is present. Global right ventricular function  is mildly to moderately reduced.     Atria  Both atria appear normal.     Mitral Valve  The mitral valve cannot be assessed.        Aortic Valve  The aortic valve cannot be assessed.     Tricuspid Valve  The tricuspid valve cannot be assessed.     Pulmonic Valve  The pulmonic valve cannot be assessed.     Vessels  The inferior vena cava cannot be assessed. The aorta root cannot be assessed.     Pericardium  Trivial pericardial effusion is present. Chamber compression is not present;  there is no evidence for tamponade.        Compared to Previous Study  This study was compared with the study from 10/10/2020 . Improvement in RV  function and RV dilation.     Attestation  I have personally viewed the imaging and agree with the interpretation and  report as documented by the fellow, Lorena Donaldson, and/or edited by me.  _____________________________________________________________________________  __                          Report approved by: Malinda Keys 10/12/2020 08:42 AM              _____________________________________________________________________________  __      IR Angiogram through Catheter Follow Up    Narrative    PROCEDURE:   1. Left pulmonary angiogram for 35 hour left pulmonary artery EKOS  ultrasound assisted catheter directed  thrombolytic therapy check.  2. Left pulmonary artery and right heart pressure measurements.    DATE: 10/12/2020 9:12 AM    INDICATION: Patient with high risk submassive pulmonary embolism  status post pulmonary thrombectomy and continuation of catheter  directed ultrasound-assisted thrombolytic therapy into the left lung.  35 hour check.    PROCEDURE/FINDINGS: Patient is brought into the angiography suite and  placed supine on the angiography table. Existing right femoral sheath,  inner EKOS infusion catheter and surrounding site were prepped and  draped sterilely. Site is infiltrated with 1% lidocaine for local  anesthesia.    The EKOS catheter is noted to remain in good position within the left  main pulmonary artery. This is exchanged over an Amplatz superstiff  guidewire for a 6 Tamazight angled pigtail catheter positioned in the  left pulmonary artery. Selective left pulmonary arteriogram  demonstrates interval clearing of clot at the bifurcation of left main  pulmonary artery with good filling of the branches and good peripheral  perfusion. No significant remaining emboli seen.    Left PA and right heart pressure measurements then obtained as follows  (mmHg):  Left Pulmonary Artery- 55/17 (32)  Main Pulmonary Artery- 56/12 (31)  Right Ventricle- 56/5 (21)  Right Atrium- (7)??    The pigtail catheter was removed over a [employer] guidewire. The right  femoral sheath is removed with a 2-0 Ethilon pursestring suture at the  site. Manual pressure held over the site. Satisfactory hemostasis  obtained.     RADIOLOGIST: Dr. Dony Flores    RESIDENT: Dr. Marcell Blackwell    MEDICATIONS: Versed 3 mg IV, Fentanyl 150 mcg IV.  Continuous  monitoring of intravenous conscious sedation was performed by the  Radiology nurse under my direct supervision.  Vital signs throughout  the procedure remained stable.  Total time of sedation was 40 minutes.     FLUOROSCOPY TIME: 3.3 minutes.    CONTRAST: 42mL Visipaque 320    ESTIMATED  BLOOD LOSS: Minimal    COMPLICATIONS: No immediate complications.      Impression    IMPRESSION:  1. 35 hour thrombolytic therapy check demonstrates clearing of  remaining significant clot from the left pulmonary artery. No visible  emboli remaining with good perfusion distally.  2. Persistent elevated right heart pressures and pulmonary  hypertension as detailed above. Again, findings suggest underlying  chronic pulmonary hypertension possibly related to patient's  obstructive sleep apnea.    NIKKO SOSA MD          Medications     Current Facility-Administered Medications   Medication     acetaminophen (TYLENOL) tablet 650 mg     alteplase (ACTIVASE) 15 mg/500 mL EKOS conc (LINE 1)- FOR Unilateral Pulmonary Embolism     calcium carbonate (TUMS) chewable tablet 500-1,000 mg     glucose gel 15-30 g    Or     dextrose 50 % injection 25-50 mL    Or     glucagon injection 1 mg     Discontinue Heparin Treatment Management ADULT orders     fentaNYL (PF) (SUBLIMAZE) injection 25-50 mcg     flumazenil (ROMAZICON) injection 0.2 mg     heparin  drip 25,000 units in 0.45% NaCl 250 mL  ANTICOAGULANT  (see additional administration details for dose)     Heparin (Venous Sheath) 25,000 units in 0.45% NaCl 250 mL  - EKOS Catheter Sheath for Unilateral Pulmonary Embolism-LINE 1     heparin bolus from infusion pump     influenza vac high-dose quad (FLUZONE HD) injection TANNER 0.7 mL     insulin aspart (NovoLOG) injection (RAPID ACTING)     lidocaine (LMX4) cream     lidocaine 1 % 0.1-1 mL     lidocaine 1 % 1-30 mL     melatonin tablet 1 mg     metoclopramide (REGLAN) tablet 5 mg    Or     metoclopramide (REGLAN) injection 5 mg     midazolam (VERSED) injection 0.5-2 mg     naloxone (NARCAN) injection 0.1-0.4 mg     No IM injections     ondansetron (ZOFRAN-ODT) ODT tab 4 mg    Or     ondansetron (ZOFRAN) injection 4 mg     oxyCODONE IR (ROXICODONE) half-tab 2.5-5 mg     Patient is already receiving anticoagulation with  heparin, enoxaparin (LOVENOX), warfarin (COUMADIN)  or other anticoagulant medication     prochlorperazine (COMPAZINE) injection 5 mg    Or     prochlorperazine (COMPAZINE) tablet 5 mg    Or     prochlorperazine (COMPAZINE) suppository 12.5 mg     senna-docusate (SENOKOT-S/PERICOLACE) 8.6-50 MG per tablet 1 tablet    Or     senna-docusate (SENOKOT-S/PERICOLACE) 8.6-50 MG per tablet 2 tablet     sodium chloride (PF) 0.9% PF flush 10 mL     sodium chloride (PF) 0.9% PF flush 3 mL     sodium chloride (PF) 0.9% PF flush 3 mL     sodium chloride 0.9% EKOS (LINE 1)     sodium chloride 0.9% infusion     sodium chloride 0.9% infusion     sodium chloride 0.9% infusion     tiZANidine (ZANAFLEX) tablet 2 mg

## 2020-10-12 NOTE — PROGRESS NOTES
Patient Name: Leander Wong  Medical Record Number: [id number]  Today's Date: 10/12/2020    Procedure: Pulmonary Angiogram with pressure measurements, lytics catheter removal  Proceduralist: Dr. Dony Flores    Procedure Start: 0840  Procedure end: 0920  Sedation medications administered: Fentanyl 150 mcg, Versed 3 mg    Report given to: FLORY Forrest 4A  : N/A    Other Notes: Pt arrived to IR room #5 from Unit 4A. Consent reviewed. Pt denies any questions or concerns regarding procedure. Pt positioned supine and monitored per protocol. Pulmonary angiogram done. Lytics catheters removed by Dr. Flores. Sutured groin site, pressure dressing applied. Bedrest x 6 hours. Pt tolerated procedure without any noted complications. Pt transferred back to Unit 4A.

## 2020-10-12 NOTE — IR NOTE
Hemodynamic pressures:    Left Pulmonary Artery- 57/10 (32),  57/15 (32)  2nd measurement Left Pulmonary Artery- 55/17 (32)    Main Pulmonary Artery- 56/12 (31)    Right Ventricle- 56/5 (21)    Right Atrium- (7)

## 2020-10-12 NOTE — CONSULTS
Care Management Assessment and Discharge Consult    General Information  Pt is w/ HTN, HLD, and DM presenting with AHRF 2/2 saddle PE s/p IR thrombectomy and placement of EKOS thrombolysis catheters.             Cognitive  Cognitive/Neuro/Behavioral: WDL                      Living Environment:   People in home: spouse          Family/Social Support:  Care provided by:  Self.      Description of Support System:   Supportive and involved.                Current Resources:   Skilled Home Care Services:  None   Community Resources:  None  Equipment currently used at home: none  Supplies currently used at home: None     Functional Status:  Prior to admission patient needed assistance:  no        Mental Health Status:  WDL                  Values/Beliefs:  Spiritual, Cultural Beliefs, Hoahaoism Practices, Values that affect care: Not assessed.                  Discharge Planning:  Expected Discharge Date:  Discharge to home or transfer to [facility].     Concerns to be Addressed: None identified at this time.        Anticipated Discharge Disposition: Home with family [facility]   Anticipated Discharge Services:  None identified at this time.  Anticipated Discharge DME: None identified at this time.    Referrals Placed by CM/SW:  Yes, [facility].     Disposition Comments:    Pt transferred from OSH with PE for higher level of care.  Pt is s/p IP thrombectomy and placement of EKOS thromolysis catheters.  Per chart review and discussion with the team, EKOS catheters have been removed today.  Pt is stable to transfer back to [facility].    RNCC contacted [facility] referral line # [phone] and informed the referral specialist that pt is [facility].  The referral specialist checked their record and stated [facility] pt but he [facility]'t [facility] from them.  The last [facility]  [facility] in [facility] ( After discussing with the [facility] referral line, [facility] team were able to locate where pt transferred from.  Pt transferred [facility]).   [facility] referral specialist stated currently they don't have open bed for today and request that we call tomorrow morning, 10/13 to check bed availability and request for the  to review pt case for acceptance.  The above info have been shared with CSI team.       Additional Information:  [facility] but [facility] is [facility].  RNCC will call [facility] referral line # [phone] tomorrow morning to check if they can accept him for continuous of care and for bed availability.  RNCC will cont to follow plan of care.      Jorje Saeed RN, PHN, BSN  4A and 4E/ ICU  Care Coordinator  Phone: [phone]  Pager: [phone]

## 2020-10-12 NOTE — PLAN OF CARE
Major Shift Events:  EKOS site discovered to have a significant amount of drainage at approx 0400, site was absent hematoma and blood was backing into Heparin line. Team notified; Will continue to monitor per their request. Pt remains very uncomfortable in bed, pain primarily located in lower back. Oxy effective, PRN tylenol effective, PRN muscle relaxer effective. Clears since 0000, NPO at 0900.     Plan: Remove sheath in IR.       For vital signs and complete assessments, please see documentation flowsheets.

## 2020-10-12 NOTE — PLAN OF CARE
Major Shift Events: Pt transferred to IR this AM for sheath removal, returned to unit around 0945 and kept flat in bed for 6 hours. Removal site dressing monitored closely, no s/s of complications, peripheral neurovasculars intact. This afternoon pt up with x1 assist, sitting in chair, reports fair appetite but does not want to eat much. PIV x1 with heparin infusing at 2250 units/hr.    Plan: Transfer to 6C when bed available. Encourage IS use and nutrition.    For vital signs and complete assessments, please see documentation flowsheets.

## 2020-10-12 NOTE — PROGRESS NOTES
[facility]   Critical Care Cardiology - Progress Note    Leander Wong MRN: [id number]  Age: 75 year old, : 1945  Date: 10/12/2020    Assessment and Plan:  Leander Wong is a 75 year old male who was admitted on 10/10/2020 with subacute chest pain.  Found to have submassive PE; as such, transferred from [facility] to [facility].  PMH is otherwise notable for HTN, T2DM, HLD, and prostate cancer s/p radical prostatectomy.     Today's Plan:  - transition to eliquis this morning  - increase lisinopril to 10mg daily  - resume PTA atorvastatin  - peripheral smear sent    Cardiovascular  # Unprovoked, Submassive pulmonary embolism  # Acute RV systolic heart failure  Hemodynamically stable on admission. CT PE positive for saddle and distal PEs. Started on heparin gtt. Now s/p IR thrombectomy, placement of EKOS thrombolysis catheters. RV failure noted on TTE 2/ submassive PE. Repeat CT PE 10/11 showed non-occlusive embolism at left pulmonary artery, segmental occlusive thrombi at left upper and lower lobes. EKOS catheter removed 10/12. Repeat ECHO 10/13, mild/moderate RV function reduction. Mild RV dilation.  - continue heparin ggt; will plan to transition to eliquis this morning  - consider lifelong AC given submassive PE  - CTEPH follow up at 3 months    # HTN  PTA, outpatient regimen including 5mg amlodipine, 50mg atenolol, 20mg lisinopril, 25mg HCTZ daily. BP now rising  - continue 2.5mg amlodipine daily  - increase to 10mg lisinopril daily  - hold PTA atenolol, HCTZ    # HLD  PTA, on 20mg atorvastatin daily  - resume this evening    Pulmonary  # Acute Hypoxemic Respiratory Failure secondary to PE  Not on oxygen at baseline; required 4L NC since arrival  - wean O2 as able    # VIOLA  - home CPAP at night    Neurology  No active issues. Alert and oriented. Pain in lower back while on bedrest.   - PRN pain medications for back pain     Infectious Disease  No active issues    Renal,  Electrolytes  No active issues    Gastrointestinal, Nutrition  No active issues    Hematology  # Unclear coagulation state  Submassive PE as above. No known clotting history.  No recent events precipitating PE.. Hgb 13.6, plt 123. Receiving heparin and tPa via EKOS for PE.   - start eliquis this morning  - CTEPH clinic follow up in 3 months     # Thrombocytopenia  Baseline platelet 190k on admission.  Now 135k, however stable  - continue to monitor  - peripheral smear sent    Endocrinology  # T2DM  Hbg A1c 7.8% on arrival. Blood glucoses elevated in the setting of acute illness.  - high sliding scale insulin with hyper/hypoglycemia protocol    Pertinent Events  Admission 10/10  PA thrombectomy 10/10  EKOS catheter 10/11-10/12    Pertinent Lines  Peripheral IV access    ICU Cares:  Fluids: not indicated  DVT Prophylaxis: eliquis  Tube Feeds: eating PO  Anticipated Floor Transfer: Floor orders in place 10/12    Patient seen and discussed with Dr. Jewel Moreno.  Assessment and plan as above.    Susan Goldsmith PA-C  Critical Care Cardiology  Pager: [phone]      Interval History:  Mild SOB.  Chest pain has entirely resolved. Denies fever, chills, nausea, abdominal pain, new LE pain/swelling.  Has mild tenderness to right groin      Objective Findings:  Temp:  [98  F (36.7  C)-99.2  F (37.3  C)] 99.1  F (37.3  C)  Pulse:  [69-92] 77  Resp:  [16-20] 18  BP: (126-167)/(69-92) 131/69  SpO2:  [91 %-97 %] 93 %    Physical Exam:  GEN: alert and oriented x4  HEENT: no appreciable cranial trauma.  Pupils reactive  Pulm: CTAB. Somewhat diminished at the bases  Cardiac: regular rate/rhythm. No appreciable murmur, rub, gallop  GI: soft, non distended  Neuro: moves all extremities symmetrically  Integument: no appreciable skin breakdown  Right Groin: ecchymosis.  No appreciable hematoma.  Dressing in place  Vascular: extremities warm, well perfused      Medications:    amLODIPine  2.5 mg Oral Daily     - MEDICATION  INSTRUCTIONS -   Does not apply Once     influenza vac high-dose quad  0.7 mL Intramuscular Prior to discharge     insulin aspart  1-6 Units Subcutaneous Q4H     lisinopril  5 mg Oral Daily     senna-docusate  1 tablet Oral BID    Or     senna-docusate  2 tablet Oral BID     sodium chloride (PF)  10 mL Intravenous Once     sodium chloride (PF)  3 mL Intracatheter Q8H       HEParin 2,250 Units/hr (10/13/20 0200)     heparin Stopped (10/12/20 0840)     - MEDICATION INSTRUCTIONS -       - MEDICATION INSTRUCTIONS -       sodium chloride EKOS (LINE 1) Stopped (10/10/20 2100)     sodium chloride Stopped (10/12/20 1330)     sodium chloride Stopped (10/12/20 0840)     sodium chloride Stopped (10/12/20 1032)         Labs:   CMP  Recent Labs   Lab 10/13/20  0326 10/11/20  0437 10/10/20  0243    137 136   POTASSIUM 3.6 4.0 3.2*   CHLORIDE 104 106 102   CO2 25 23 23   ANIONGAP 8 8 11   * 140* 159*   BUN 9 27 17   CR 0.75 0.92 0.87   GFRESTIMATED >90 83 85   GFRESTBLACK >90 >90 >90   SUN 8.0* 7.8* 8.4*   MAG  --  2.3  --    PROTTOTAL  --   --  7.1   ALBUMIN  --   --  3.4   BILITOTAL  --   --  0.7   ALKPHOS  --   --  92   AST  --   --  15   ALT  --   --  28     CBC  Recent Labs   Lab 10/13/20  0326 10/12/20  1629 10/12/20  1154 10/12/20  0329   WBC 10.1 10.5 10.1 10.4   RBC 4.19* 4.96 4.54 4.52   HGB 12.9* 15.2 13.9 13.6   HCT 38.8* 47.1 42.4 42.9   MCV 93 95 93 95   MCH 30.8 30.6 30.6 30.1   MCHC 33.2 32.3 32.8 31.7   RDW 12.7 12.6 12.6 12.7   * 127* 117* 123*     Arterial Blood Gas  Recent Labs   Lab 10/10/20  0255   PH 7.41   PCO2 37   PO2 75*   HCO3 23   O2PER 10L         Pertinent Imaging Studies:  Echocardiogram 10/10/2020:  Stat TTE for patient with pulmonary embolism  Global right ventricular function is moderately to severely reduced. Mild to moderate right ventricular dilation is present. RV free wall hypokinesis with RV apical hyperkinesis ([place]'s sign) noted.  Left ventricular function is  normal.The EF is > 65%. LV regional wall motion is probably normal.  Limited assessment of the valves.  IVC diameter <2.1 cm collapsing >50% with sniff suggests a normal RA pressure of 3 mmHg.    CT PE 10/11/2020:  1. Persistent pulmonary emboli, including a nonocclusive embolism at the bifurcation of the left pulmonary artery. There are segmental occlusive thrombi in the left upper and left lower lobes, and segmental nonocclusive thrombi in the right upper and right lower lobes.  2. Cardiomegaly with right ventricular dilatation, suggestive of right heart strain.  3. Small bilateral pleural effusions with areas of associated atelectasis.    Echocardiogram 10/12/2020:  Technically difficult study due to poor acoustic windows and interference from EKOS catheter.  Limited study to evaluate RV function.  Grossly normal LV function on limited views.  Global right ventricular function is mildly to moderately reduced. Mild right ventricular dilation is present.  Trivial pericardial effusion is present.  Chamber compression is not present; there is no evidence for tamponade.  This study was compared with the study from 10/10/2020. Improvement in RV function and RV dilation.      Susan Goldsmith PA-C  Critical Care Cardiology  Pager: [phone]

## 2020-10-12 NOTE — PROCEDURES
[facility]     Procedure: IR Procedure Note    Date/Time: 10/12/2020 9:43 AM  Performed by: Dony Flores MD  Authorized by: Dony Flores MD     UNIVERSAL PROTOCOL   Site Marked: NA  Prior Images Obtained and Reviewed:  Yes  Required items: Required blood products, implants, devices and special equipment available    Patient identity confirmed:  Verbally with patient, arm band, provided demographic data and hospital-assigned identification number  Patient was reevaluated immediately before administering moderate or deep sedation or anesthesia  Confirmation Checklist:  Patient's identity using two indicators, relevant allergies, procedure was appropriate and matched the consent or emergent situation and correct equipment/implants were available  Time out: Immediately prior to the procedure a time out was called    Universal Protocol: the Joint Commission Universal Protocol was followed    Preparation: Patient was prepped and draped in usual sterile fashion           ANESTHESIA    Anesthesia: Local infiltration  Local Anesthetic:  Lidocaine 1% without epinephrine  Anesthetic Total (mL):  15      SEDATION    Patient Sedated: Yes    Sedation:  Fentanyl and midazolam  Vital signs: Vital signs monitored during sedation    See dictated procedure note for full details.  Findings: 35hr EKOS check. L pulmonary angio shows no significant remaining PE with good peripheral perfusion. Pressures still high again with probable underlying chronic pulm htn:     Left Pulmonary Artery- 57/10 (32),  57/15 (32)  2nd measurement Left Pulmonary Artery- 55/17 (32)     Main Pulmonary Artery- 56/12 (31)     Right Ventricle- 56/5 (21)     Right Atrium- (7)      Lytics discontinued. Sheath removed. Hemostasis obtained (purstring at site). Heparin restarted at rate prior to lytics (2100u/h) without bolus.     Transition to DOAC.   F/U per cards.       Specimens:  none    Complications: None    Condition: Stable    PROCEDURE   Patient Tolerance:  Patient tolerated the procedure well with no immediate complications    Length of time physician/provider present for 1:1 monitoring during sedation: 45

## 2020-10-13 LAB
ANION GAP SERPL CALCULATED.3IONS-SCNC: 8 MMOL/L (ref 3–14)
BASOPHILS # BLD AUTO: 0.1 10E9/L (ref 0–0.2)
BASOPHILS NFR BLD AUTO: 0.6 %
BUN SERPL-MCNC: 9 MG/DL (ref 7–30)
CALCIUM SERPL-MCNC: 8 MG/DL (ref 8.5–10.1)
CHLORIDE SERPL-SCNC: 104 MMOL/L (ref 94–109)
CO2 SERPL-SCNC: 25 MMOL/L (ref 20–32)
COPATH REPORT: NORMAL
CREAT SERPL-MCNC: 0.75 MG/DL (ref 0.66–1.25)
DIFFERENTIAL METHOD BLD: NORMAL
EOSINOPHIL # BLD AUTO: 0.4 10E9/L (ref 0–0.7)
EOSINOPHIL NFR BLD AUTO: 3.6 %
ERYTHROCYTE [DISTWIDTH] IN BLOOD BY AUTOMATED COUNT: 12.6 % (ref 10–15)
ERYTHROCYTE [DISTWIDTH] IN BLOOD BY AUTOMATED COUNT: 12.7 % (ref 10–15)
FIBRINOGEN PPP-MCNC: 364 MG/DL (ref 200–420)
FIBRINOGEN PPP-MCNC: 423 MG/DL (ref 200–420)
GFR SERPL CREATININE-BSD FRML MDRD: >90 ML/MIN/{1.73_M2}
GLUCOSE BLDC GLUCOMTR-MCNC: 134 MG/DL (ref 70–99)
GLUCOSE BLDC GLUCOMTR-MCNC: 137 MG/DL (ref 70–99)
GLUCOSE BLDC GLUCOMTR-MCNC: 141 MG/DL (ref 70–99)
GLUCOSE BLDC GLUCOMTR-MCNC: 159 MG/DL (ref 70–99)
GLUCOSE BLDC GLUCOMTR-MCNC: 167 MG/DL (ref 70–99)
GLUCOSE BLDC GLUCOMTR-MCNC: 198 MG/DL (ref 70–99)
GLUCOSE SERPL-MCNC: 142 MG/DL (ref 70–99)
HCT VFR BLD AUTO: 38.8 % (ref 40–53)
HCT VFR BLD AUTO: 40.5 % (ref 40–53)
HGB BLD-MCNC: 12.9 G/DL (ref 13.3–17.7)
HGB BLD-MCNC: 13.4 G/DL (ref 13.3–17.7)
IMM GRANULOCYTES # BLD: 0 10E9/L (ref 0–0.4)
IMM GRANULOCYTES NFR BLD: 0.4 %
INR PPP: 1.09 (ref 0.86–1.14)
LMWH PPP CHRO-ACNC: 0.63 IU/ML
LYMPHOCYTES # BLD AUTO: 1.3 10E9/L (ref 0.8–5.3)
LYMPHOCYTES NFR BLD AUTO: 12.8 %
MCH RBC QN AUTO: 30.4 PG (ref 26.5–33)
MCH RBC QN AUTO: 30.8 PG (ref 26.5–33)
MCHC RBC AUTO-ENTMCNC: 33.1 G/DL (ref 31.5–36.5)
MCHC RBC AUTO-ENTMCNC: 33.2 G/DL (ref 31.5–36.5)
MCV RBC AUTO: 92 FL (ref 78–100)
MCV RBC AUTO: 93 FL (ref 78–100)
MONOCYTES # BLD AUTO: 1 10E9/L (ref 0–1.3)
MONOCYTES NFR BLD AUTO: 10.4 %
NEUTROPHILS # BLD AUTO: 7.1 10E9/L (ref 1.6–8.3)
NEUTROPHILS NFR BLD AUTO: 72.2 %
NRBC # BLD AUTO: 0 10*3/UL
NRBC BLD AUTO-RTO: 0 /100
PLATELET # BLD AUTO: 135 10E9/L (ref 150–450)
PLATELET # BLD AUTO: 141 10E9/L (ref 150–450)
POTASSIUM SERPL-SCNC: 3.6 MMOL/L (ref 3.4–5.3)
RBC # BLD AUTO: 4.19 10E12/L (ref 4.4–5.9)
RBC # BLD AUTO: 4.41 10E12/L (ref 4.4–5.9)
RETICS # AUTO: 82.8 10E9/L (ref 25–95)
RETICS/RBC NFR AUTO: 1.9 % (ref 0.5–2)
SODIUM SERPL-SCNC: 137 MMOL/L (ref 133–144)
WBC # BLD AUTO: 10.1 10E9/L (ref 4–11)
WBC # BLD AUTO: 8.7 10E9/L (ref 4–11)

## 2020-10-13 PROCEDURE — 250N000013 HC RX MED GY IP 250 OP 250 PS 637: Performed by: STUDENT IN AN ORGANIZED HEALTH CARE EDUCATION/TRAINING PROGRAM

## 2020-10-13 PROCEDURE — 90662 IIV NO PRSV INCREASED AG IM: CPT | Performed by: INTERNAL MEDICINE

## 2020-10-13 PROCEDURE — G0008 ADMIN INFLUENZA VIRUS VAC: HCPCS | Performed by: INTERNAL MEDICINE

## 2020-10-13 PROCEDURE — 999N001086 HC STATISTIC MORPHOLOGY W/INTERP HEMEPATH TC 85060: Performed by: PHYSICIAN ASSISTANT

## 2020-10-13 PROCEDURE — 36415 COLL VENOUS BLD VENIPUNCTURE: CPT | Performed by: RADIOLOGY

## 2020-10-13 PROCEDURE — 85060 BLOOD SMEAR INTERPRETATION: CPT | Mod: 26 | Performed by: PATHOLOGY

## 2020-10-13 PROCEDURE — 85384 FIBRINOGEN ACTIVITY: CPT | Performed by: RADIOLOGY

## 2020-10-13 PROCEDURE — 999N000128 HC STATISTIC PERIPHERAL IV START W/O US GUIDANCE

## 2020-10-13 PROCEDURE — 85610 PROTHROMBIN TIME: CPT | Performed by: RADIOLOGY

## 2020-10-13 PROCEDURE — 85027 COMPLETE CBC AUTOMATED: CPT | Performed by: RADIOLOGY

## 2020-10-13 PROCEDURE — 250N000011 HC RX IP 250 OP 636: Performed by: INTERNAL MEDICINE

## 2020-10-13 PROCEDURE — 999N001017 HC STATISTIC GLUCOSE BY METER IP

## 2020-10-13 PROCEDURE — 85045 AUTOMATED RETICULOCYTE COUNT: CPT | Performed by: RADIOLOGY

## 2020-10-13 PROCEDURE — 85004 AUTOMATED DIFF WBC COUNT: CPT | Performed by: RADIOLOGY

## 2020-10-13 PROCEDURE — 250N000013 HC RX MED GY IP 250 OP 250 PS 637: Performed by: PHYSICIAN ASSISTANT

## 2020-10-13 PROCEDURE — 85520 HEPARIN ASSAY: CPT | Performed by: RADIOLOGY

## 2020-10-13 PROCEDURE — 250N000013 HC RX MED GY IP 250 OP 250 PS 637: Performed by: NURSE PRACTITIONER

## 2020-10-13 PROCEDURE — 120N000002 HC R&B MED SURG/OB UMMC

## 2020-10-13 PROCEDURE — 80048 BASIC METABOLIC PNL TOTAL CA: CPT | Performed by: RADIOLOGY

## 2020-10-13 PROCEDURE — 99232 SBSQ HOSP IP/OBS MODERATE 35: CPT | Performed by: INTERNAL MEDICINE

## 2020-10-13 RX ORDER — ATORVASTATIN CALCIUM 20 MG/1
20 TABLET, FILM COATED ORAL EVERY EVENING
Status: DISCONTINUED | OUTPATIENT
Start: 2020-10-13 | End: 2020-10-14 | Stop reason: HOSPADM

## 2020-10-13 RX ORDER — DEXTROSE MONOHYDRATE 25 G/50ML
25-50 INJECTION, SOLUTION INTRAVENOUS
Status: DISCONTINUED | OUTPATIENT
Start: 2020-10-13 | End: 2020-10-13

## 2020-10-13 RX ORDER — LISINOPRIL 10 MG/1
10 TABLET ORAL DAILY
Status: DISCONTINUED | OUTPATIENT
Start: 2020-10-13 | End: 2020-10-14

## 2020-10-13 RX ORDER — NICOTINE POLACRILEX 4 MG
15-30 LOZENGE BUCCAL
Status: DISCONTINUED | OUTPATIENT
Start: 2020-10-13 | End: 2020-10-13

## 2020-10-13 RX ADMIN — LISINOPRIL 10 MG: 10 TABLET ORAL at 07:27

## 2020-10-13 RX ADMIN — APIXABAN 10 MG: 2.5 TABLET, FILM COATED ORAL at 09:43

## 2020-10-13 RX ADMIN — ACETAMINOPHEN 650 MG: 325 TABLET, FILM COATED ORAL at 20:54

## 2020-10-13 RX ADMIN — AMLODIPINE BESYLATE 2.5 MG: 2.5 TABLET ORAL at 07:27

## 2020-10-13 RX ADMIN — APIXABAN 10 MG: 2.5 TABLET, FILM COATED ORAL at 19:27

## 2020-10-13 RX ADMIN — INFLUENZA A VIRUS A/MICHIGAN/45/2015 X-275 (H1N1) ANTIGEN (FORMALDEHYDE INACTIVATED), INFLUENZA A VIRUS A/SINGAPORE/INFIMH-16-0019/2016 IVR-186 (H3N2) ANTIGEN (FORMALDEHYDE INACTIVATED), INFLUENZA B VIRUS B/PHUKET/3073/2013 ANTIGEN (FORMALDEHYDE INACTIVATED), AND INFLUENZA B VIRUS B/MARYLAND/15/2016 BX-69A ANTIGEN (FORMALDEHYDE INACTIVATED) 0.7 ML: 60; 60; 60; 60 INJECTION, SUSPENSION INTRAMUSCULAR at 13:48

## 2020-10-13 RX ADMIN — DOCUSATE SODIUM 50 MG AND SENNOSIDES 8.6 MG 1 TABLET: 8.6; 5 TABLET, FILM COATED ORAL at 07:27

## 2020-10-13 RX ADMIN — ATORVASTATIN CALCIUM 20 MG: 20 TABLET, FILM COATED ORAL at 19:26

## 2020-10-13 ASSESSMENT — ACTIVITIES OF DAILY LIVING (ADL)
ADLS_ACUITY_SCORE: 17

## 2020-10-13 NOTE — PROGRESS NOTES
"    Interventional Radiology Progress Note     October 13, 2020    Subjective: Mr. Wong is resting comfortably in bed. Has been up to the bathroom with an assist. Tolerating diet. Looking forward to ambulation in the hallway this afternoon. Reports MOROCHO is improved from admission. Denies pain in right groin site. Planning for discharge hopefully tomorrow.     Objective: BP (!) 145/89 (BP Location: Left arm)   Pulse 82   Temp 98.7  F (37.1  C) (Oral)   Resp 18   Ht 1.803 m (5' 11\")   Wt 126.1 kg (278 lb)   SpO2 94%   BMI 38.77 kg/m      Physical Exam:  Gen: Awake, alert and oriented, NAD  Chest: non-labored breathing with O2 via NC  Extremities: right groin dressing was removed, ecchymosis in right groin and up onto abdomen, soft, non-tender to palpation    Assessment/Plan: This is a 75 year old male  admitted 10/10/2020 with submassive saddle PE with RV strain/dysfunction and bilateral lower ext DVTs now s/p  IR thrombectomy and  EKOS for PE.   -right groin pressure dressing was removed, area was redressed and purse string suture was left in place   -purse string suture will be removed 10/14 by IR team  -up/ambulation with assist  -anticoagulation per primary team    Courtney Baron DNP, MARBELLA  Interventional Radiology   IR on-call pager: [phone]  "

## 2020-10-13 NOTE — PLAN OF CARE
Major Shift Events:  Pt up to void multiple times during the evening; Denies pain, N/T, or N/V.   Plan: Pt has 6C orders, but is fit for discharge. Will continue to monitor.   For vital signs and complete assessments, please see documentation flowsheets.

## 2020-10-13 NOTE — PROGRESS NOTES
Care Coordinator Progress Note    Admission Date/Time:  10/10/2020  Attending MD:  Aditya Mullen MD    Data  Chart reviewed, discussed with interdisciplinary team.   Patient transferred from OSH with PE for higher level of care.  Pt is s/p IP thrombectomy and placement of EKOS thromolysis catheters.    Assessment/ Coordination of Care   RNCC visited pt and spouse to follow [facility] regarding [facility].  [facility] stated [facility] not [facility].  Pt stated he doesn't want to got to [facility], his plan is to stay here and be discharge to home with OP CR if needed.  Pt stated he was ind. with all cares and mobility prior hospitalization.  Pt declined for any discharge assistance need at this time.  RNCC will cont to follow plan of care.     Plan  Anticipated Discharge Date:  TBD.  Anticipated Discharge Plan:   Home with family assist.  RNCC will cont to follow plan of care.      Jorje Saeed RN, PHN, BSN  4A and 4E/ ICU  Care Coordinator  Phone: [phone]  Pager: [phone]

## 2020-10-13 NOTE — PLAN OF CARE
Major Shift Events: Pt O2 sats low-mid 90's on 2L per NC most of shift, plan to bleed O2 into home CPAP as needed overnight. Up with standby assist-independent, ambulating in hallway without oxygen with sats at 92%, tolerated well. IR provider assessed R groin site, stated plan to remove stitch tomorrow prior to discharge. Chronic low back pain managed with positioning, pt declined pain medications. Showered independently, spouse at bedside most of shift.  At 1800 pt reported soreness in groin increasing since shower, small induration noted at groin site. Provider updated per phone, sandbag applied to site and pt instructed to maintain bedrest for 1 hour per telephone order from CSI provider.    Plan: Transfer to 6C when bed available vs discharge home tomorrow on PO anticoagulants. Continue to monitor.    For vital signs and complete assessments, please see documentation flowsheets.

## 2020-10-14 ENCOUNTER — PATIENT OUTREACH (OUTPATIENT)
Dept: CARE COORDINATION | Facility: CLINIC | Age: 75
End: 2020-10-14

## 2020-10-14 VITALS
SYSTOLIC BLOOD PRESSURE: 153 MMHG | WEIGHT: 278 LBS | TEMPERATURE: 98.6 F | HEIGHT: 71 IN | BODY MASS INDEX: 38.92 KG/M2 | OXYGEN SATURATION: 91 % | HEART RATE: 81 BPM | RESPIRATION RATE: 18 BRPM | DIASTOLIC BLOOD PRESSURE: 82 MMHG

## 2020-10-14 LAB
ANION GAP SERPL CALCULATED.3IONS-SCNC: 6 MMOL/L (ref 3–14)
BUN SERPL-MCNC: 11 MG/DL (ref 7–30)
CALCIUM SERPL-MCNC: 8 MG/DL (ref 8.5–10.1)
CHLORIDE SERPL-SCNC: 103 MMOL/L (ref 94–109)
CO2 SERPL-SCNC: 27 MMOL/L (ref 20–32)
CREAT SERPL-MCNC: 0.66 MG/DL (ref 0.66–1.25)
ERYTHROCYTE [DISTWIDTH] IN BLOOD BY AUTOMATED COUNT: 12.7 % (ref 10–15)
FIBRINOGEN PPP-MCNC: 395 MG/DL (ref 200–420)
GFR SERPL CREATININE-BSD FRML MDRD: >90 ML/MIN/{1.73_M2}
GLUCOSE BLDC GLUCOMTR-MCNC: 184 MG/DL (ref 70–99)
GLUCOSE BLDC GLUCOMTR-MCNC: 186 MG/DL (ref 70–99)
GLUCOSE BLDC GLUCOMTR-MCNC: 187 MG/DL (ref 70–99)
GLUCOSE SERPL-MCNC: 173 MG/DL (ref 70–99)
HCT VFR BLD AUTO: 38.8 % (ref 40–53)
HGB BLD-MCNC: 12.9 G/DL (ref 13.3–17.7)
INR PPP: 1.18 (ref 0.86–1.14)
MCH RBC QN AUTO: 30.7 PG (ref 26.5–33)
MCHC RBC AUTO-ENTMCNC: 33.2 G/DL (ref 31.5–36.5)
MCV RBC AUTO: 92 FL (ref 78–100)
PLATELET # BLD AUTO: 141 10E9/L (ref 150–450)
POTASSIUM SERPL-SCNC: 3.4 MMOL/L (ref 3.4–5.3)
RBC # BLD AUTO: 4.2 10E12/L (ref 4.4–5.9)
SODIUM SERPL-SCNC: 136 MMOL/L (ref 133–144)
WBC # BLD AUTO: 8.7 10E9/L (ref 4–11)

## 2020-10-14 PROCEDURE — 85610 PROTHROMBIN TIME: CPT | Performed by: RADIOLOGY

## 2020-10-14 PROCEDURE — 85384 FIBRINOGEN ACTIVITY: CPT | Performed by: RADIOLOGY

## 2020-10-14 PROCEDURE — 85027 COMPLETE CBC AUTOMATED: CPT | Performed by: RADIOLOGY

## 2020-10-14 PROCEDURE — 99238 HOSP IP/OBS DSCHRG MGMT 30/<: CPT | Performed by: NURSE PRACTITIONER

## 2020-10-14 PROCEDURE — 250N000013 HC RX MED GY IP 250 OP 250 PS 637: Performed by: PHYSICIAN ASSISTANT

## 2020-10-14 PROCEDURE — 250N000013 HC RX MED GY IP 250 OP 250 PS 637: Performed by: STUDENT IN AN ORGANIZED HEALTH CARE EDUCATION/TRAINING PROGRAM

## 2020-10-14 PROCEDURE — 36415 COLL VENOUS BLD VENIPUNCTURE: CPT | Performed by: RADIOLOGY

## 2020-10-14 PROCEDURE — 999N001017 HC STATISTIC GLUCOSE BY METER IP

## 2020-10-14 PROCEDURE — 80048 BASIC METABOLIC PNL TOTAL CA: CPT | Performed by: RADIOLOGY

## 2020-10-14 PROCEDURE — 250N000013 HC RX MED GY IP 250 OP 250 PS 637: Performed by: NURSE PRACTITIONER

## 2020-10-14 PROCEDURE — 999N000147 HC STATISTIC PT IP EVAL DEFER

## 2020-10-14 RX ORDER — AMLODIPINE BESYLATE 5 MG/1
5 TABLET ORAL DAILY
Status: DISCONTINUED | OUTPATIENT
Start: 2020-10-14 | End: 2020-10-14 | Stop reason: HOSPADM

## 2020-10-14 RX ORDER — AMLODIPINE BESYLATE 5 MG/1
5 TABLET ORAL DAILY
Qty: 90 TABLET | Refills: 3 | Status: SHIPPED | OUTPATIENT
Start: 2020-10-15

## 2020-10-14 RX ORDER — LISINOPRIL 10 MG/1
20 TABLET ORAL DAILY
Status: DISCONTINUED | OUTPATIENT
Start: 2020-10-15 | End: 2020-10-14 | Stop reason: HOSPADM

## 2020-10-14 RX ORDER — LISINOPRIL 20 MG/1
20 TABLET ORAL DAILY
Qty: 180 TABLET | Refills: 1 | Status: SHIPPED | OUTPATIENT
Start: 2020-10-15 | End: 2021-02-12

## 2020-10-14 RX ADMIN — AMLODIPINE BESYLATE 5 MG: 5 TABLET ORAL at 07:16

## 2020-10-14 RX ADMIN — LISINOPRIL 10 MG: 10 TABLET ORAL at 07:16

## 2020-10-14 RX ADMIN — ACETAMINOPHEN 650 MG: 325 TABLET, FILM COATED ORAL at 06:44

## 2020-10-14 RX ADMIN — APIXABAN 10 MG: 2.5 TABLET, FILM COATED ORAL at 07:16

## 2020-10-14 ASSESSMENT — ACTIVITIES OF DAILY LIVING (ADL)
ADLS_ACUITY_SCORE: 17
ADLS_ACUITY_SCORE: 15
ADLS_ACUITY_SCORE: 17
ADLS_ACUITY_SCORE: 17

## 2020-10-14 NOTE — PLAN OF CARE
Patient has been assessed for Home Oxygen needs. Oxygen readings:    *Pulse oximetry (SpO2) = 91% on room air at rest while awake.    *SpO2 improved to 95% on 2 liters/minute at rest.    *SpO2 = 91-96% on room air during activity/with exercise.    *SpO2 not tested with oxygen during activity/with exercise.    Pt has been on home CPAP with 2 liters/minute while asleep.

## 2020-10-14 NOTE — PLAN OF CARE
4AB PT - Defer    PT: Orders received, chart reviewed, discussed with nursing. Pt ambulating IND. Orders asking for SPO2 testing. See RN note yesterday, RN planning to re-check today. No PT needs identified. Will complete order.

## 2020-10-14 NOTE — DISCHARGE SUMMARY
[address] [address]  p: [phone]    Discharge Summary: Cardiology Service    Leander Wong MRN# [id number]   YOB: 1945 Age: 75 year old       Admission Date: 10/10/2020  Discharge Date: 10/14/20    Discharge Diagnoses:  1. Submassive pulmonary embolism  2. Acute RV systolic heart failure  3. Hypertension  4. Hyperlipidemia  5. Acute hypoxic respiratory failure  6. VIOLA  7. Thrombocytopenia  8. T2DM    Brief HPI:  Leander Wong is a 75 year old male who was admitted on 10/10/2020 with subacute chest pain. Found to have submassive PE; as such, transferred from [facility] to [facility].  PMH is otherwise notable for HTN, T2DM, HLD, and prostate cancer s/p radical prostatectomy.     Hospital Course by Diagnosis:  # Unprovoked, Submassive pulmonary embolism  # Acute RV systolic heart failure  Hemodynamically stable on admission. CT PE positive for saddle and distal PEs. Started on heparin gtt. Now s/p IR thrombectomy, placement of EKOS thrombolysis catheters. RV failure noted on TTE 2/2 submassive PE. Repeat CT PE 10/11 showed non-occlusive embolism at left pulmonary artery, segmental occlusive thrombi at left upper and lower lobes. EKOS catheter removed 10/12. Repeat ECHO 10/13, mild/moderate RV function reduction. Mild RV dilation.  - Continue Eliquis 10 mg BID x 7 days (last dose 10/19), then 5 mg twice daily indefinitely   - Consider lifelong AC given submassive PE  - Follow-up with Dr. Benson in [place] for CTEPH evaluation   - Repeat echo in 1 month      # HTN  PTA regimen includes 5mg amlodipine, 50mg atenolol, 20-25mg lisinopril-hydrochlorothiazide. Was hypotensive, now stable. Holding PTA atenolol and hydrochlorothiazide.   - Continue amlodipine 5 mg daily   - Continue lisinopril 20 mg  - Will stop lisinopril-hydrochlorothiazide and atenolol, can re-evaluate as outpatient.   - CBC/BMP in 1 week     # HLD  On PTA 20mg  atorvastatin daily  - Continue PTA atorvastatin      # Acute Hypoxemic Respiratory Failure secondary to PE  Not on oxygen at baseline; required 4L NC since arrival, now weaned off. Maintaining sats > 92% on RA.   - Continue to monitor     # VIOLA  - home CPAP at night       # Thrombocytopenia  Baseline platelet 190k on admission.  Now 141k, however stable  - Continue to monitor  - Peripheral smear sent:    Slight normochromic, normocytic anemia   Slight thrombocytopenia with normal platelet morphology  - Repeat CBC in 1 week    # T2DM  Hbg A1c 7.8% on arrival. Blood glucoses elevated in the setting of acute illness.  - high sliding scale insulin with hyper/hypoglycemia protocol     Pertinent Events  Admission 10/10  PA thrombectomy 10/10  EKOS catheter 10/11-10/12    Pertinent Procedures:  1. IR-guided thrombectomy    Medication Changes:  See below     Discharge medications:   Current Discharge Medication List      START taking these medications    Details   amLODIPine (NORVASC) 5 MG tablet Take 1 tablet (5 mg) by mouth daily  Qty: 90 tablet, Refills: 3    Associated Diagnoses: Benign essential hypertension      apixaban ANTICOAGULANT (ELIQUIS) 5 MG tablet Take 2 tablets (10 mg) by mouth 2 times daily  Qty: 180 tablet, Refills: 0    Comments: Take 2 tablets (10 mg) by mouth twice daily for 7 days (through 10/19), then take 5 mg twice daily  Associated Diagnoses: Acute saddle pulmonary embolism with acute cor pulmonale (H)      lisinopril (ZESTRIL) 20 MG tablet Take 1 tablet (20 mg) by mouth daily  Qty: 180 tablet, Refills: 1    Associated Diagnoses: Benign essential hypertension         CONTINUE these medications which have NOT CHANGED    Details   alpha-lipoic acid 100 MG capsule Take 600 mg by mouth daily (with lunch)      atorvastatin (LIPITOR) 20 MG tablet Take 20 mg by mouth daily         STOP taking these medications       amLODIPine-atorvastatin (CADUET) 5-10 MG tablet Comments:   Reason for Stopping:          aspirin 81 MG EC tablet Comments:   Reason for Stopping:         atenolol (TENORMIN) 50 MG tablet Comments:   Reason for Stopping:         lisinopril-hydrochlorothiazide (ZESTORETIC) 20-25 MG tablet Comments:   Reason for Stopping:               Follow-up:  - Dr. Benson on 11/18/20 at [place]   - PCP in 7 days     Labs or imaging requiring follow-up after discharge:  - Echo in 1 month  - CBC and BMP in 1 week     Code status:  Full     Condition on discharge  Temp: 98.6  F (37  C) Temp src: Oral BP: 137/86 Pulse: 87   Resp: 18 SpO2: 94 % O2 Device: Nasal cannula Oxygen Delivery: 2 LPM  General: Alert, interactive, NAD  Eyes: sclera anicteric, EOMI  Neck: JVP 0, carotid 2+ bilaterally  Cardiovascular: regular rate and rhythm, normal S1 and S2, no murmurs, gallops, or rubs  Resp: clear to auscultation bilaterally, no rales, wheezes, or rhonchi  GI: Soft, nontender, nondistended. +BS.  No HSM or masses, no rebound or guarding.  Extremities: No edema, no cyanosis or clubbing, dorsalis pedis and posterior tibialis pulses 2+ bilaterally  Skin: Warm and dry, no jaundice or rash  Neuro: CN 2-12 intact, moves all extremities equally  Psych: Alert & oriented x 3    Imaging with results:  Echocardiogram 10/12/20:  Interpretation Summary  Technically difficult study due to poor acoustic windows and interference from  EKOS catheter.  Limited study to evaluate RV function.     Grossly normal LV function on limited views.  Global right ventricular function is mildly to moderately reduced. Mild right  ventricular dilation is present.  Trivial pericardial effusion is present.  Chamber compression is not present; there is no evidence for tamponade.  This study was compared with the study from 10/10/2020. Improvement in RV  function and RV dilation.    Echo: 10/10/20  Interpretation Summary  Stat TTE for patient with pulmonary embolism  Technically difficult study. Poor acoustic windows.     Global right ventricular function is  moderately to severely reduced. Mild to  moderate right ventricular dilation is present. RV free wall hypokinesis with  RV apical hyperkinesis ([place]'s sign) noted.  Left ventricular function is normal.The EF is > 65%. LV regional wall motion  is probably normal.  Limited assessment of the valves.  IVC diameter <2.1 cm collapsing >50% with sniff suggests a normal RA pressure  of 3 mmHg.  There is no prior study for direct comparison.     IR-guided thrombectomy: 10/12/20  IMPRESSION:  1. 35 hour thrombolytic therapy check demonstrates clearing of  remaining significant clot from the left pulmonary artery. No visible  emboli remaining with good perfusion distally.  2. Persistent elevated right heart pressures and pulmonary  hypertension as detailed above. Again, findings suggest underlying  chronic pulmonary hypertension possibly related to patient's  obstructive sleep apnea.    No care team member to display    Maricel Madden DNP, APRN, CNP  [facility] Cardiology  [phone]

## 2020-10-14 NOTE — PLAN OF CARE
Major Shift Events:  Hematoma on R. Groin access site remains the size of a golfball and tender to touch; No other complaints from patient. No other major events overnight. Appropriate for discharge.   Plan: [facility] to [facility], or [facility]. Will continue to monitor and intervene at the team's request.     For vital signs and complete assessments, please see documentation flowsheets.

## 2020-10-14 NOTE — PROGRESS NOTES
"    Interventional Radiology Progress Note     October 14, 2020    Subjective: Mr. Wong is resting comfortably in chair and able to navigate to bed without difficulty. Has been up to the bathroom with an assist and ambulating per pt/wife/RN. Tolerating diet. Reports more tenderness in the groin after ambulation. RN notes more firmness on groin as compared to yesterday's exam.    Objective: BP (!) 152/85   Pulse 74   Temp 98.9  F (37.2  C) (Oral)   Resp 16   Ht 1.803 m (5' 11\")   Wt 126.1 kg (278 lb)   SpO2 94%   BMI 38.77 kg/m      Physical Exam:  Gen: Alert and oriented, up in chair  Chest: non-labored breathing  Extremities: right groin ecchymosis stable, tender on palpation in area of suture with some increased firmness, no active bleeding    Assessment/Plan: This is a 75 year old male  admitted 10/10/2020 with submassive saddle PE with RV strain/dysfunction and bilateral lower ext DVTs now s/p  IR thrombectomy and  EKOS for PE.   -right groin access suture removed with some difficulty, suture embedded in the skin, patient did not tolerate removal without numbing with lidocaine, dressing placed to site.  -Pt ok to ambulate with assist, site should be monitored closely for signs of bleeding  -Dressing should remain on 24-48 hrs      Courtney Baron DNP, APRN  Interventional Radiology   IR on-call pager: [phone]    "

## 2020-10-14 NOTE — PLAN OF CARE
Discharged to: home at 1450, spouse transporting  Belongings: sent with patient and spouse  AVS (After Visit Summary) discussed with: patient and spouse, all questions answered

## 2020-11-03 ENCOUNTER — HOSPITAL ENCOUNTER (OUTPATIENT)
Dept: CARDIOLOGY | Facility: CLINIC | Age: 75
Discharge: HOME | End: 2020-11-03
Attending: FAMILY MEDICINE | Admitting: FAMILY MEDICINE
Payer: COMMERCIAL

## 2020-11-03 DIAGNOSIS — I26.92 SADDLE PULMONARY EMBOLUS (H): Primary | ICD-10-CM

## 2020-11-03 PROCEDURE — 93308 TTE F-UP OR LMTD: CPT | Mod: 26 | Performed by: INTERNAL MEDICINE

## 2020-11-03 PROCEDURE — 93308 TTE F-UP OR LMTD: CPT

## 2020-11-03 PROCEDURE — 93321 DOPPLER ECHO F-UP/LMTD STD: CPT | Mod: 26 | Performed by: INTERNAL MEDICINE

## 2020-11-03 PROCEDURE — 255N000002 HC RX 255 OP 636: Performed by: INTERNAL MEDICINE

## 2020-11-03 PROCEDURE — 93325 DOPPLER ECHO COLOR FLOW MAPG: CPT | Mod: 26 | Performed by: INTERNAL MEDICINE

## 2020-11-03 RX ADMIN — HUMAN ALBUMIN MICROSPHERES AND PERFLUTREN 9 ML: 10; .22 INJECTION, SOLUTION INTRAVENOUS at 13:30

## 2020-11-18 ENCOUNTER — HOSPITAL ENCOUNTER (OUTPATIENT)
Dept: CT IMAGING | Facility: CLINIC | Age: 75
Discharge: HOME | End: 2020-11-18
Attending: INTERNAL MEDICINE | Admitting: INTERNAL MEDICINE
Payer: COMMERCIAL

## 2020-11-18 ENCOUNTER — OFFICE VISIT (OUTPATIENT)
Dept: CARDIOLOGY | Facility: CLINIC | Age: 75
End: 2020-11-18
Payer: COMMERCIAL

## 2020-11-18 VITALS
BODY MASS INDEX: 37.93 KG/M2 | HEIGHT: 72 IN | HEART RATE: 78 BPM | DIASTOLIC BLOOD PRESSURE: 93 MMHG | WEIGHT: 280 LBS | SYSTOLIC BLOOD PRESSURE: 164 MMHG

## 2020-11-18 DIAGNOSIS — I26.02 ACUTE SADDLE PULMONARY EMBOLISM WITH ACUTE COR PULMONALE (H): Primary | ICD-10-CM

## 2020-11-18 DIAGNOSIS — I26.02 ACUTE SADDLE PULMONARY EMBOLISM WITH ACUTE COR PULMONALE (H): ICD-10-CM

## 2020-11-18 PROBLEM — E11.9 DIABETES MELLITUS, TYPE 2 (H): Status: ACTIVE | Noted: 2020-11-18

## 2020-11-18 LAB
CREAT BLD-MCNC: 1 MG/DL (ref 0.66–1.25)
GFR SERPL CREATININE-BSD FRML MDRD: 73 ML/MIN/{1.73_M2}

## 2020-11-18 PROCEDURE — 82565 ASSAY OF CREATININE: CPT

## 2020-11-18 PROCEDURE — 250N000011 HC RX IP 250 OP 636: Performed by: INTERNAL MEDICINE

## 2020-11-18 PROCEDURE — 71275 CT ANGIOGRAPHY CHEST: CPT

## 2020-11-18 PROCEDURE — 250N000009 HC RX 250: Performed by: INTERNAL MEDICINE

## 2020-11-18 PROCEDURE — 99215 OFFICE O/P EST HI 40 MIN: CPT | Performed by: INTERNAL MEDICINE

## 2020-11-18 RX ORDER — DULAGLUTIDE 1.5 MG/.5ML
1.5 INJECTION, SOLUTION SUBCUTANEOUS
COMMUNITY
Start: 2020-06-15

## 2020-11-18 RX ORDER — IOPAMIDOL 755 MG/ML
83 INJECTION, SOLUTION INTRAVASCULAR ONCE
Status: COMPLETED | OUTPATIENT
Start: 2020-11-18 | End: 2020-11-18

## 2020-11-18 RX ORDER — GLIPIZIDE 5 MG/1
5 TABLET ORAL DAILY
COMMUNITY
Start: 2020-09-28

## 2020-11-18 RX ADMIN — SODIUM CHLORIDE 100 ML: 9 INJECTION, SOLUTION INTRAVENOUS at 15:00

## 2020-11-18 RX ADMIN — IOPAMIDOL 83 ML: 755 INJECTION, SOLUTION INTRAVENOUS at 15:00

## 2020-11-18 ASSESSMENT — MIFFLIN-ST. JEOR: SCORE: 2045.13

## 2020-11-18 NOTE — PROGRESS NOTES
Vascular Cardiology Consultation      HPI:     This is a 75 year old male with PMH DMII, HTN, HLD here for follow up for saddle PE. Referred from my colleague Dr. Flores. He was admitted 10/10/2020 with subacute chest pain. Found to have submassive PE; as such, transferred from [facility] to [facility].      In reviewing his records, he had unprovoked submassive PE with acute RV strain. Remained hemodynamically stable on admission and CT showed saddle PE and high distal clot burden. He was started on heparin gtt and underwent catheter based thrombectomy and placement of catheter directed lysis for persistent distal clot burden. Echocardiogram demonstrated moderate dysfunction of the RV post procedure and CTPE 10/11 showed showed non-occlusive embolism at left pulmonary artery, segmental occlusive thrombi at left upper and lower lobes. He was started on apixaban but developed a rash with this and subsequently transtioned to rivaroxaban. He was considered for lifelong anticoagulation therapy.     Since discharge he does not feel he has full stamina back. Every day walks with dog for 30-60 minutes. [place] dog in hunting [employer]. Went hunting day before yesterday and felt ok, without significant difficulty in breathing. He has no lower leg cramping or edema.     Leading up to event he had low grade fever. He was tested for COVID with nasal swab twice. He had no recent long flights (although did travel to [place] several months earlier). He has no known history of VTE in the family, but father and mother both experienced sudden death out of hospital without autopsy. Both were presumed heart attacks. He does have agent orange exposure history.     ASSESSMENT/PLAN:    75 year old with intermediate - high risk PE s/p catheter directed thrombectomy and catheter directed lysis for high clot burden. Residual RV dysfunction noted on repeat echocardiogram, with adequate basal contractility but free wall  hypokinesis. He is not with LE edema or pain to suggest recurrent DVT, but his functional capacity seems not back to normal at this time. Unable to obtain TR Doppler profile to assess PA systolic function on his repeat echocardiogram.    Would like to evaluate patient for RPVO (residual pulmonary vascular occlusion) and burden. Will get a baseline by CT angiogram today and repeat echocardiogram in 3 months. If still concerning findings of RV dysfunction with inability to obtain PA pressure by echocardiogram will consider right heart catheterization to evaluate for CTEPH. Will continue indefinite anticoagulation therapy as his recurrent PE risk is high. I have also discussed cardiac rehab with Mr. Wong, to regain functional capacity given RV dysfunction. I would like them to assess his oxygen saturations with exercise as well as heart rate response.     Summary:    1. Cardiac rehab referral   2. Echocardiogram in 3 months  3. CT chest scan in upcoming week   4. Follow up in 3 months   5. Indefinite rivaroxaban     Anais Benson MD MSc  [facility]          PAST MEDICAL HISTORY  Past Medical History:   Diagnosis Date     Diabetes mellitus, type 2 (H)      Essential hypertension      Mixed hyperlipidemia      Prostate cancer (H) 2007    s/p radical prostatectomy     Pulmonary embolism (H)        CURRENT MEDICATIONS  Current Outpatient Medications   Medication Sig Dispense Refill     alpha-lipoic acid 100 MG capsule Take 600 mg by mouth daily (with lunch)       amLODIPine (NORVASC) 5 MG tablet Take 1 tablet (5 mg) by mouth daily 90 tablet 3     atorvastatin (LIPITOR) 20 MG tablet Take 20 mg by mouth daily       empagliflozin (JARDIANCE) 25 MG TABS tablet daily       glipiZIDE (GLUCOTROL) 5 MG tablet Take 5 mg by mouth daily       lisinopril (ZESTRIL) 20 MG tablet Take 1 tablet (20 mg) by mouth daily 180 tablet 1     rivaroxaban ANTICOAGULANT (XARELTO) 20 MG TABS tablet Take 20 mg by mouth daily        TRULICITY 1.5 MG/0.5ML pen          PAST SURGICAL HISTORY:  Past Surgical History:   Procedure Laterality Date     APPENDECTOMY       CHOLECYSTECTOMY       IR ANGIOGRAM THROUGH CATHETER FOLLOW UP  10/12/2020     IR PULMONARY ANGIOGRAM BILATERAL  10/10/2020       ALLERGIES     Allergies   Allergen Reactions     Apixaban Rash and Itching       FAMILY HISTORY  Family History   Problem Relation Age of Onset     Myocardial Infarction Mother 71     Myocardial Infarction Father 69     Unexplained death Sister          SOCIAL HISTORY  Social History     Socioeconomic History     Marital status: Single     Spouse name: Not on file     Number of children: Not on file     Years of education: Not on file     Highest education level: Not on file   Occupational History     Not on file   Social Needs     Financial resource strain: Not on file     Food insecurity     Worry: Not on file     Inability: Not on file     Transportation needs     Medical: Not on file     Non-medical: Not on file   Tobacco Use     Smoking status: Former Smoker     Quit date:      Years since quittin.9     Smokeless tobacco: Never Used   Substance and Sexual Activity     Alcohol use: Not Currently     Comment: >30 yrs sobriety ()     Drug use: Not on file     Sexual activity: Not on file   Lifestyle     Physical activity     Days per week: Not on file     Minutes per session: Not on file     Stress: Not on file   Relationships     Social connections     Talks on phone: Not on file     Gets together: Not on file     Attends [employer] service: Not on file     Active member of club or organization: Not on file     Attends meetings of clubs or organizations: Not on file     Relationship status: Not on file     Intimate partner violence     Fear of current or ex partner: Not on file     Emotionally abused: Not on file     Physically abused: Not on file     Forced sexual activity: Not on file   Other Topics Concern     Not on file   Social History  "Narrative     Not on file       ROS:   Constitutional: No fever, chills, or sweats. No weight gain/loss   ENT: No visual disturbance, ear ache, epistaxis, sore throat  Allergies/Immunologic: Negative  Respiratory: No cough, hemoptysia  Cardiovascular: As per HPI  GI: No nausea, vomiting, hematemesis, melena, or hematochezia  : No urinary frequency, dysuria, or hematuria  Integument: Negative  Psychiatric: Negative  Neuro: Negative  Endocrinology: Negative   Musculoskeletal: Negative  Vascular: No walking impairment, claudication, ischemic rest pain or nonhealing wounds    EXAM:  BP (!) 164/93   Pulse 78   Ht 1.816 m (5' 11.5\")   Wt 127 kg (280 lb)   BMI 38.51 kg/m    In general, the patient is a pleasant male in no apparent distress.    HEENT: NC/AT.  PERRLA.  EOMI.  Sclerae white, not injected.  Nares clear.  Pharynx without erythema or exudate.  Dentition intact.    Neck: No adenopathy.  No thyromegaly. Carotids +2/2 bilaterally without bruits.  No jugular venous distension.   Heart: RRR. Normal S1, S2 splits physiologically. No murmur, rub, click, or gallop. The PMI is in the 5th ICS in the midclavicular line. There is no heave.    Lungs: CTA.  No ronchi, wheezes, rales.  No dullness to percussion.   Abdomen: Soft, nontender, nondistended. No organomegaly. No AAA.  No bruits.   Extremities: No clubbing, cyanosis, or edema.  No wounds. No varicose veins signs of chronic venous insufficiency.   Vascular: No bruits are noted.    Labs:  LIPID RESULTS:  No results found for: CHOL, HDL, LDL, TRIG, CHOLHDLRATIO, NHDL    LIVER ENZYME RESULTS:  Lab Results   Component Value Date    AST 15 10/10/2020    ALT 28 10/10/2020       CBC RESULTS:  Lab Results   Component Value Date    WBC 8.7 10/14/2020    RBC 4.20 (L) 10/14/2020    HGB 12.9 (L) 10/14/2020    HCT 38.8 (L) 10/14/2020    MCV 92 10/14/2020    MCH 30.7 10/14/2020    MCHC 33.2 10/14/2020    RDW 12.7 10/14/2020     (L) 10/14/2020       BMP RESULTS:  Lab " Results   Component Value Date     10/14/2020    POTASSIUM 3.4 10/14/2020    CHLORIDE 103 10/14/2020    CO2 27 10/14/2020    ANIONGAP 6 10/14/2020     (H) 10/14/2020    BUN 11 10/14/2020    CR 0.66 10/14/2020    GFRESTIMATED >90 10/14/2020    GFRESTBLACK >90 10/14/2020    SUN 8.0 (L) 10/14/2020        A1C RESULTS:  Lab Results   Component Value Date    A1C 7.8 (H) 10/10/2020

## 2020-11-18 NOTE — PATIENT INSTRUCTIONS
1. Cardiac rehab   2. Echocardiogram in 3 months  3. CT chest scan in upcoming week   4. Follow up with Dr. Benson in 3 months   5. COVID antibody test

## 2020-11-18 NOTE — LETTER
11/18/2020    MATT  [facility] [id number]  [place] [address]    RE: Leander Wong       Dear Colleague,    I had the pleasure of seeing Leander Wong in the [facility].      HPI:     This is a 75 year old male with PMH DMII, HTN, HLD here for follow up for saddle PE. Referred from my colleague Dr. Flores. He was admitted 10/10/2020 with subacute chest pain. Found to have submassive PE; as such, transferred from [facility] to [facility].      In reviewing his records, he had unprovoked submassive PE with acute RV strain. Remained hemodynamically stable on admission and CT showed saddle PE and high distal clot burden. He was started on heparin gtt and underwent catheter based thrombectomy and placement of catheter directed lysis for persistent distal clot burden. Echocardiogram demonstrated moderate dysfunction of the RV post procedure and CTPE 10/11 showed showed non-occlusive embolism at left pulmonary artery, segmental occlusive thrombi at left upper and lower lobes. He was started on apixaban but developed a rash with this and subsequently transtioned to rivaroxaban. He was considered for lifelong anticoagulation therapy.     Since discharge he does not feel he has full stamina back. Every day walks with dog for 30-60 minutes. Runs dog in hunting tournaments. Went hunting day before yesterday and felt ok, without significant difficulty in breathing. He has no lower leg cramping or edema.     Leading up to event he had low grade fever. He was tested for COVID with nasal swab twice. He had no recent long flights (although did travel to [place] several months earlier). He has no known history of VTE in the family, but father and mother both experienced sudden death out of hospital without autopsy. Both were presumed heart attacks. He does have agent orange exposure history.     ASSESSMENT/PLAN:    75 year old with intermediate - high risk PE s/p  catheter directed thrombectomy and catheter directed lysis for high clot burden. Residual RV dysfunction noted on repeat echocardiogram, with adequate basal contractility but free wall hypokinesis. He is not with LE edema or pain to suggest recurrent DVT, but his functional capacity seems not back to normal at this time. Unable to obtain TR Doppler profile to assess PA systolic function on his repeat echocardiogram.    Would like to evaluate patient for RPVO (residual pulmonary vascular occlusion) and burden. Will get a baseline by CT angiogram today and repeat echocardiogram in 3 months. If still concerning findings of RV dysfunction with inability to obtain PA pressure by echocardiogram will consider right heart catheterization to evaluate for CTEPH. Will continue indefinite anticoagulation therapy as his recurrent PE risk is high. I have also discussed cardiac rehab with Mr. Wong, to regain functional capacity given RV dysfunction. I would like them to assess his oxygen saturations with exercise as well as heart rate response.     Summary:    1. Cardiac rehab referral   2. Echocardiogram in 3 months  3. CT chest scan in upcoming week   4. Follow up in 3 months   5. Indefinite rivaroxaban     Anais Benson MD MSc  M [facility]          PAST MEDICAL HISTORY  Past Medical History:   Diagnosis Date     Diabetes mellitus, type 2 (H)      Essential hypertension      Mixed hyperlipidemia      Prostate cancer (H) 2007    s/p radical prostatectomy     Pulmonary embolism (H)        CURRENT MEDICATIONS  Current Outpatient Medications   Medication Sig Dispense Refill     alpha-lipoic acid 100 MG capsule Take 600 mg by mouth daily (with lunch)       amLODIPine (NORVASC) 5 MG tablet Take 1 tablet (5 mg) by mouth daily 90 tablet 3     atorvastatin (LIPITOR) 20 MG tablet Take 20 mg by mouth daily       empagliflozin (JARDIANCE) 25 MG TABS tablet daily       glipiZIDE (GLUCOTROL) 5 MG tablet Take 5 mg by mouth daily        lisinopril (ZESTRIL) 20 MG tablet Take 1 tablet (20 mg) by mouth daily 180 tablet 1     rivaroxaban ANTICOAGULANT (XARELTO) 20 MG TABS tablet Take 20 mg by mouth daily       TRULICITY 1.5 MG/0.5ML pen          PAST SURGICAL HISTORY:  Past Surgical History:   Procedure Laterality Date     APPENDECTOMY       CHOLECYSTECTOMY       IR ANGIOGRAM THROUGH CATHETER FOLLOW UP  10/12/2020     IR PULMONARY ANGIOGRAM BILATERAL  10/10/2020       ALLERGIES     Allergies   Allergen Reactions     Apixaban Rash and Itching       FAMILY HISTORY  Family History   Problem Relation Age of Onset     Myocardial Infarction Mother 71     Myocardial Infarction Father 69     Unexplained death Sister          SOCIAL HISTORY  Social History     Socioeconomic History     Marital status: Single     Spouse name: Not on file     Number of children: Not on file     Years of education: Not on file     Highest education level: Not on file   Occupational History     Not on file   Social Needs     Financial resource strain: Not on file     Food insecurity     Worry: Not on file     Inability: Not on file     Transportation needs     Medical: Not on file     Non-medical: Not on file   Tobacco Use     Smoking status: Former Smoker     Quit date:      Years since quittin.9     Smokeless tobacco: Never Used   Substance and Sexual Activity     Alcohol use: Not Currently     Comment: >30 yrs sobriety ()     Drug use: Not on file     Sexual activity: Not on file   Lifestyle     Physical activity     Days per week: Not on file     Minutes per session: Not on file     Stress: Not on file   Relationships     Social connections     Talks on phone: Not on file     Gets together: Not on file     Attends [employer] service: Not on file     Active member of club or organization: Not on file     Attends meetings of clubs or organizations: Not on file     Relationship status: Not on file     Intimate partner violence     Fear of current or ex partner:  "Not on file     Emotionally abused: Not on file     Physically abused: Not on file     Forced sexual activity: Not on file   Other Topics Concern     Not on file   Social History Narrative     Not on file       ROS:   Constitutional: No fever, chills, or sweats. No weight gain/loss   ENT: No visual disturbance, ear ache, epistaxis, sore throat  Allergies/Immunologic: Negative  Respiratory: No cough, hemoptysia  Cardiovascular: As per HPI  GI: No nausea, vomiting, hematemesis, melena, or hematochezia  : No urinary frequency, dysuria, or hematuria  Integument: Negative  Psychiatric: Negative  Neuro: Negative  Endocrinology: Negative   Musculoskeletal: Negative  Vascular: No walking impairment, claudication, ischemic rest pain or nonhealing wounds    EXAM:  BP (!) 164/93   Pulse 78   Ht 1.816 m (5' 11.5\")   Wt 127 kg (280 lb)   BMI 38.51 kg/m    In general, the patient is a pleasant male in no apparent distress.    HEENT: NC/AT.  PERRLA.  EOMI.  Sclerae white, not injected.  Nares clear.  Pharynx without erythema or exudate.  Dentition intact.    Neck: No adenopathy.  No thyromegaly. Carotids +2/2 bilaterally without bruits.  No jugular venous distension.   Heart: RRR. Normal S1, S2 splits physiologically. No murmur, rub, click, or gallop. The PMI is in the 5th ICS in the midclavicular line. There is no heave.    Lungs: CTA.  No ronchi, wheezes, rales.  No dullness to percussion.   Abdomen: Soft, nontender, nondistended. No organomegaly. No AAA.  No bruits.   Extremities: No clubbing, cyanosis, or edema.  No wounds. No varicose veins signs of chronic venous insufficiency.   Vascular: No bruits are noted.    Labs:  LIPID RESULTS:  No results found for: CHOL, HDL, LDL, TRIG, CHOLHDLRATIO, NHDL    LIVER ENZYME RESULTS:  Lab Results   Component Value Date    AST 15 10/10/2020    ALT 28 10/10/2020       CBC RESULTS:  Lab Results   Component Value Date    WBC 8.7 10/14/2020    RBC 4.20 (L) 10/14/2020    HGB 12.9 (L) " 10/14/2020    HCT 38.8 (L) 10/14/2020    MCV 92 10/14/2020    MCH 30.7 10/14/2020    MCHC 33.2 10/14/2020    RDW 12.7 10/14/2020     (L) 10/14/2020       BMP RESULTS:  Lab Results   Component Value Date     10/14/2020    POTASSIUM 3.4 10/14/2020    CHLORIDE 103 10/14/2020    CO2 27 10/14/2020    ANIONGAP 6 10/14/2020     (H) 10/14/2020    BUN 11 10/14/2020    CR 0.66 10/14/2020    GFRESTIMATED >90 10/14/2020    GFRESTBLACK >90 10/14/2020    SUN 8.0 (L) 10/14/2020        A1C RESULTS:  Lab Results   Component Value Date    A1C 7.8 (H) 10/10/2020       Thank you for allowing me to participate in the care of your patient.    Sincerely,     Anais Benson MD     [facility]

## 2020-11-22 DIAGNOSIS — I26.02 ACUTE SADDLE PULMONARY EMBOLISM WITH ACUTE COR PULMONALE (H): ICD-10-CM

## 2020-11-22 PROCEDURE — 86769 SARS-COV-2 COVID-19 ANTIBODY: CPT | Performed by: INTERNAL MEDICINE

## 2020-11-22 PROCEDURE — 36415 COLL VENOUS BLD VENIPUNCTURE: CPT | Performed by: INTERNAL MEDICINE

## 2020-11-24 ENCOUNTER — CARE COORDINATION (OUTPATIENT)
Dept: CARDIOLOGY | Facility: CLINIC | Age: 75
End: 2020-11-24

## 2020-11-24 NOTE — PROGRESS NOTES
Chest CT Pulmonary Embolism w/ Contrast:     FINDINGS:  ANGIOGRAM CHEST: Overall the burden of pulmonary emboli have decreased  bilaterally since the comparison study. There remains pulmonary emboli  in second and third order branches to the left lower lobe. Right-sided  pulmonary emboli appear to have completely resolved. Thoracic aorta is  negative for dissection. No CT evidence of right heart strain.    IMPRESSION:  1.  Improved but not resolved pulmonary emboli since the comparison  Study.    Last OV 11/18/20:   ASSESSMENT/PLAN:     75 year old with intermediate - high risk PE s/p catheter directed thrombectomy and catheter directed lysis for high clot burden. Residual RV dysfunction noted on repeat echocardiogram, with adequate basal contractility but free wall hypokinesis. He is not with LE edema or pain to suggest recurrent DVT, but his functional capacity seems not back to normal at this time. Unable to obtain TR Doppler profile to assess PA systolic function on his repeat echocardiogram.     Would like to evaluate patient for RPVO (residual pulmonary vascular occlusion) and burden. Will get a baseline by CT angiogram today and repeat echocardiogram in 3 months. If still concerning findings of RV dysfunction with inability to obtain PA pressure by echocardiogram will consider right heart catheterization to evaluate for CTEPH. Will continue indefinite anticoagulation therapy as his recurrent PE risk is high. I have also discussed cardiac rehab with Mr. Wong, to regain functional capacity given RV dysfunction. I would like them to assess his oxygen saturations with exercise as well as heart rate response.      Summary:     1. Cardiac rehab referral   2. Echocardiogram in 3 months  3. CT chest scan in upcoming week   4. Follow up in 3 months   5. Indefinite rivaroxaban       Will route to Dr. Benson for review.     FLORY Haider November 24, 2020 2:41 PM

## 2020-11-25 NOTE — PROGRESS NOTES
Echo ordered for February. Results and recommendations sent to patient via [email or web address].     Dr. Benson's recommendations    Thanks Vanessa.     Once the CT shows residual clot, we will have close surveillance in symptoms and right sided heart function. I usually continue with the plan of checking an echo in another 3 months. If there is any abnormality, we do a test to check the pressures in the heart to see if the residual clots are causing issues. We hope however that 3 more months of anticoagulation will resolve the clot (and the body breaks them down as well). Good news is no NEW clot which I wanted to make sure of. Continue current plan.     Dr. Benson

## 2020-11-26 LAB
COVID-19 SPIKE RBD ABY TITER: NORMAL
COVID-19 SPIKE RBD ABY: NEGATIVE

## 2020-11-30 DIAGNOSIS — I50.30 DIASTOLIC HEART FAILURE, UNSPECIFIED HF CHRONICITY (H): Primary | ICD-10-CM

## 2020-12-03 ENCOUNTER — HOSPITAL ENCOUNTER (OUTPATIENT)
Dept: CARDIAC REHAB | Facility: CLINIC | Age: 75
End: 2020-12-03
Attending: INTERNAL MEDICINE
Payer: COMMERCIAL

## 2020-12-03 DIAGNOSIS — I50.30 DIASTOLIC HEART FAILURE, UNSPECIFIED HF CHRONICITY (H): ICD-10-CM

## 2020-12-03 PROCEDURE — 999N000193 HC STATISTIC VISIT PULM REHAB: Performed by: CLINICAL EXERCISE PHYSIOLOGIST

## 2020-12-03 PROCEDURE — G0238 OTH RESP PROC, INDIV: HCPCS | Performed by: CLINICAL EXERCISE PHYSIOLOGIST

## 2020-12-10 ENCOUNTER — HOSPITAL ENCOUNTER (OUTPATIENT)
Dept: CARDIAC REHAB | Facility: CLINIC | Age: 75
End: 2020-12-10
Attending: INTERNAL MEDICINE
Payer: COMMERCIAL

## 2020-12-10 PROCEDURE — G0238 OTH RESP PROC, INDIV: HCPCS | Performed by: CLINICAL EXERCISE PHYSIOLOGIST

## 2020-12-10 PROCEDURE — 999N000193 HC STATISTIC VISIT PULM REHAB: Performed by: CLINICAL EXERCISE PHYSIOLOGIST

## 2020-12-15 ENCOUNTER — HOSPITAL ENCOUNTER (OUTPATIENT)
Dept: CARDIAC REHAB | Facility: CLINIC | Age: 75
End: 2020-12-15
Attending: INTERNAL MEDICINE
Payer: COMMERCIAL

## 2020-12-15 PROCEDURE — 999N000193 HC STATISTIC VISIT PULM REHAB

## 2020-12-15 PROCEDURE — G0239 OTH RESP PROC, GROUP: HCPCS

## 2020-12-22 ENCOUNTER — HOSPITAL ENCOUNTER (OUTPATIENT)
Dept: CARDIAC REHAB | Facility: CLINIC | Age: 75
End: 2020-12-22
Attending: INTERNAL MEDICINE
Payer: COMMERCIAL

## 2020-12-22 PROCEDURE — 999N000193 HC STATISTIC VISIT PULM REHAB

## 2020-12-22 PROCEDURE — G0239 OTH RESP PROC, GROUP: HCPCS

## 2020-12-29 ENCOUNTER — HOSPITAL ENCOUNTER (OUTPATIENT)
Dept: CARDIAC REHAB | Facility: CLINIC | Age: 75
End: 2020-12-29
Attending: INTERNAL MEDICINE
Payer: COMMERCIAL

## 2020-12-29 PROCEDURE — G0239 OTH RESP PROC, GROUP: HCPCS

## 2020-12-29 PROCEDURE — 999N000193 HC STATISTIC VISIT PULM REHAB

## 2021-01-05 ENCOUNTER — HOSPITAL ENCOUNTER (OUTPATIENT)
Dept: CARDIAC REHAB | Facility: CLINIC | Age: 76
End: 2021-01-05
Attending: INTERNAL MEDICINE
Payer: COMMERCIAL

## 2021-01-05 PROCEDURE — G0239 OTH RESP PROC, GROUP: HCPCS | Performed by: REHABILITATION PRACTITIONER

## 2021-01-05 PROCEDURE — 999N000193 HC STATISTIC VISIT PULM REHAB: Performed by: REHABILITATION PRACTITIONER

## 2021-01-07 ENCOUNTER — HOSPITAL ENCOUNTER (OUTPATIENT)
Dept: CARDIAC REHAB | Facility: CLINIC | Age: 76
End: 2021-01-07
Attending: INTERNAL MEDICINE
Payer: COMMERCIAL

## 2021-01-07 PROCEDURE — G0239 OTH RESP PROC, GROUP: HCPCS

## 2021-01-07 PROCEDURE — 999N000193 HC STATISTIC VISIT PULM REHAB

## 2021-01-12 ENCOUNTER — HOSPITAL ENCOUNTER (OUTPATIENT)
Dept: CARDIAC REHAB | Facility: CLINIC | Age: 76
End: 2021-01-12
Attending: INTERNAL MEDICINE
Payer: COMMERCIAL

## 2021-01-12 PROCEDURE — 999N000193 HC STATISTIC VISIT PULM REHAB

## 2021-01-12 PROCEDURE — G0239 OTH RESP PROC, GROUP: HCPCS

## 2021-01-15 ENCOUNTER — HEALTH MAINTENANCE LETTER (OUTPATIENT)
Age: 76
End: 2021-01-15

## 2021-01-19 ENCOUNTER — HOSPITAL ENCOUNTER (OUTPATIENT)
Dept: CARDIAC REHAB | Facility: CLINIC | Age: 76
End: 2021-01-19
Attending: INTERNAL MEDICINE
Payer: COMMERCIAL

## 2021-01-19 PROCEDURE — 999N000193 HC STATISTIC VISIT PULM REHAB

## 2021-01-19 PROCEDURE — G0239 OTH RESP PROC, GROUP: HCPCS

## 2021-01-21 ENCOUNTER — HOSPITAL ENCOUNTER (OUTPATIENT)
Dept: CARDIAC REHAB | Facility: CLINIC | Age: 76
End: 2021-01-21
Attending: INTERNAL MEDICINE
Payer: COMMERCIAL

## 2021-01-21 PROCEDURE — G0239 OTH RESP PROC, GROUP: HCPCS | Performed by: CLINICAL EXERCISE PHYSIOLOGIST

## 2021-01-21 PROCEDURE — 999N000193 HC STATISTIC VISIT PULM REHAB: Performed by: CLINICAL EXERCISE PHYSIOLOGIST

## 2021-01-26 ENCOUNTER — HOSPITAL ENCOUNTER (OUTPATIENT)
Dept: CARDIAC REHAB | Facility: CLINIC | Age: 76
End: 2021-01-26
Attending: INTERNAL MEDICINE
Payer: COMMERCIAL

## 2021-01-26 PROCEDURE — 999N000193 HC STATISTIC VISIT PULM REHAB

## 2021-01-26 PROCEDURE — G0239 OTH RESP PROC, GROUP: HCPCS

## 2021-01-28 ENCOUNTER — HOSPITAL ENCOUNTER (OUTPATIENT)
Dept: CARDIAC REHAB | Facility: CLINIC | Age: 76
End: 2021-01-28
Attending: INTERNAL MEDICINE
Payer: COMMERCIAL

## 2021-01-28 PROCEDURE — G0238 OTH RESP PROC, INDIV: HCPCS

## 2021-01-28 PROCEDURE — 999N000193 HC STATISTIC VISIT PULM REHAB

## 2021-02-02 ENCOUNTER — HOSPITAL ENCOUNTER (OUTPATIENT)
Dept: CARDIAC REHAB | Facility: CLINIC | Age: 76
End: 2021-02-02
Attending: INTERNAL MEDICINE
Payer: COMMERCIAL

## 2021-02-02 PROCEDURE — G0239 OTH RESP PROC, GROUP: HCPCS

## 2021-02-02 PROCEDURE — 999N000193 HC STATISTIC VISIT PULM REHAB

## 2021-02-09 ENCOUNTER — HOSPITAL ENCOUNTER (OUTPATIENT)
Dept: CARDIAC REHAB | Facility: CLINIC | Age: 76
End: 2021-02-09
Attending: INTERNAL MEDICINE
Payer: COMMERCIAL

## 2021-02-09 PROCEDURE — G0239 OTH RESP PROC, GROUP: HCPCS

## 2021-02-10 ENCOUNTER — HOSPITAL ENCOUNTER (OUTPATIENT)
Dept: CARDIOLOGY | Facility: CLINIC | Age: 76
Discharge: HOME | End: 2021-02-10
Attending: INTERNAL MEDICINE | Admitting: INTERNAL MEDICINE
Payer: COMMERCIAL

## 2021-02-10 DIAGNOSIS — I26.02 ACUTE SADDLE PULMONARY EMBOLISM WITH ACUTE COR PULMONALE (H): ICD-10-CM

## 2021-02-10 PROCEDURE — 255N000002 HC RX 255 OP 636: Performed by: INTERNAL MEDICINE

## 2021-02-10 PROCEDURE — 999N000208 ECHOCARDIOGRAM COMPLETE

## 2021-02-10 PROCEDURE — 93306 TTE W/DOPPLER COMPLETE: CPT | Mod: 26 | Performed by: INTERNAL MEDICINE

## 2021-02-10 RX ADMIN — HUMAN ALBUMIN MICROSPHERES AND PERFLUTREN 9 ML: 10; .22 INJECTION, SOLUTION INTRAVENOUS at 09:30

## 2021-02-11 ENCOUNTER — HOSPITAL ENCOUNTER (OUTPATIENT)
Dept: CARDIAC REHAB | Facility: CLINIC | Age: 76
End: 2021-02-11
Attending: INTERNAL MEDICINE
Payer: COMMERCIAL

## 2021-02-11 PROCEDURE — 999N000193 HC STATISTIC VISIT PULM REHAB

## 2021-02-11 PROCEDURE — G0239 OTH RESP PROC, GROUP: HCPCS

## 2021-02-11 PROCEDURE — G0238 OTH RESP PROC, INDIV: HCPCS

## 2021-02-12 ENCOUNTER — OFFICE VISIT (OUTPATIENT)
Dept: CARDIOLOGY | Facility: CLINIC | Age: 76
End: 2021-02-12
Attending: INTERNAL MEDICINE
Payer: COMMERCIAL

## 2021-02-12 VITALS
WEIGHT: 277 LBS | DIASTOLIC BLOOD PRESSURE: 103 MMHG | BODY MASS INDEX: 37.52 KG/M2 | SYSTOLIC BLOOD PRESSURE: 173 MMHG | HEIGHT: 72 IN | HEART RATE: 78 BPM

## 2021-02-12 DIAGNOSIS — I26.02 ACUTE SADDLE PULMONARY EMBOLISM WITH ACUTE COR PULMONALE (H): ICD-10-CM

## 2021-02-12 PROCEDURE — 99214 OFFICE O/P EST MOD 30 MIN: CPT | Performed by: INTERNAL MEDICINE

## 2021-02-12 RX ORDER — LISINOPRIL 40 MG/1
40 TABLET ORAL DAILY
Qty: 30 TABLET | Refills: 3 | Status: SHIPPED | OUTPATIENT
Start: 2021-02-12

## 2021-02-12 ASSESSMENT — MIFFLIN-ST. JEOR: SCORE: 2031.52

## 2021-02-12 NOTE — PROGRESS NOTES
HPI: This is a 75 year old male with PMH DMII, HTN, HLD here for follow up for saddle PE. Referred from my colleague Dr. Flores. He was admitted 10/10/2020 with subacute chest pain. Found to have submassive PE; as such, transferred from [facility] to [facility].       In reviewing his records, he had unprovoked submassive PE with acute RV strain. Remained hemodynamically stable on admission and CT showed saddle PE and high distal clot burden. He was started on heparin gtt and underwent catheter based thrombectomy and placement of catheter directed lysis for persistent distal clot burden. Echocardiogram demonstrated moderate dysfunction of the RV post procedure and CTPE 10/11 showed showed non-occlusive embolism at left pulmonary artery, segmental occlusive thrombi at left upper and lower lobes. He was started on apixaban but developed a rash with this and subsequently transtioned to rivaroxaban. He was considered for lifelong anticoagulation therapy.      Since discharge he does not feel he has full stamina back. Every day walks with dog for 30-60 minutes. 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 He has no lower leg cramping and mild LE edema.      Leading up to event he had low grade fever. He was tested for COVID with nasal swab twice. He had no recent long flights (although did travel to [place] several months earlier). He has no known history of VTE in the family, but father and mother both experienced sudden death out of hospital without autopsy. Both were presumed heart attacks. He does have agent orange exposure history.     Interval history: he enrolled in cardiac rehab and has been doing well. His HR has been under good control. He denies any SOB or chest pain but occasionally he will have a cough. He is hypertensive today, remains on lisinopril. CT chest demonstrated resolved right sided burden, improved left. 2nd and 3rd order vessels  still with thrombus.  Echocardiogram demonstrates resolved RV function. No pulmonary hypertension.     ASSESSMENT/PLAN:     75 year old with intermediate - high risk PE s/p catheter directed thrombectomy and catheter directed lysis for high clot burden. Residual RV dysfunction was noted on repeat echocardiogram, with adequate basal contractility but free wall hypokinesis. He is not with LE edema or pain to suggest recurrent DVT, but his functional capacity seems not back to normal at this time. Unable to obtain TR Doppler profile to assess PA systolic function on his repeat echocardiogram. He is enrolled in cardiac rehab and doing well which we will continue.      I wanted to evaluate patient for RPVO (residual pulmonary vascular occlusion) and burden which was demonstrated on CT. Will continue indefinite anticoagulation therapy as his recurrent PE risk is high.      Summary:     1. Cardiac rehab   2. Echocardiogram yearly  3. CT chest or VQ scan repeated only if ongoing symptoms in future   4. Follow up in 3 months   5. Indefinite rivaroxaban   6.  HTN: increase lisinopril to 40 mg daily     Subhash Benson MD MSC  [facility]        PAST MEDICAL HISTORY  Past Medical History:   Diagnosis Date     Diabetes mellitus, type 2 (H)      Essential hypertension      Mixed hyperlipidemia      Prostate cancer (H) 2007    s/p radical prostatectomy     Pulmonary embolism (H)        CURRENT MEDICATIONS  Current Outpatient Medications   Medication Sig Dispense Refill     alpha-lipoic acid 100 MG capsule Take 600 mg by mouth daily (with lunch)       amLODIPine (NORVASC) 5 MG tablet Take 1 tablet (5 mg) by mouth daily 90 tablet 3     atorvastatin (LIPITOR) 20 MG tablet Take 20 mg by mouth daily       empagliflozin (JARDIANCE) 25 MG TABS tablet daily       glipiZIDE (GLUCOTROL) 5 MG tablet Take 5 mg by mouth daily       lisinopril (ZESTRIL) 20 MG tablet Take 1 tablet (20 mg) by mouth daily 180 tablet 1     rivaroxaban  ANTICOAGULANT (XARELTO) 20 MG TABS tablet Take 20 mg by mouth daily       TRULICITY 1.5 MG/0.5ML pen          PAST SURGICAL HISTORY:  Past Surgical History:   Procedure Laterality Date     APPENDECTOMY       CHOLECYSTECTOMY       IR ANGIOGRAM THROUGH CATHETER FOLLOW UP  10/12/2020     IR PULMONARY ANGIOGRAM BILATERAL  10/10/2020       ALLERGIES     Allergies   Allergen Reactions     Apixaban Rash and Itching       FAMILY HISTORY  Family History   Problem Relation Age of Onset     Myocardial Infarction Mother 71     Myocardial Infarction Father 69     Unexplained death Sister          SOCIAL HISTORY  Social History     Socioeconomic History     Marital status: Single     Spouse name: Not on file     Number of children: Not on file     Years of education: Not on file     Highest education level: Not on file   Occupational History     Not on file   Social Needs     Financial resource strain: Not on file     Food insecurity     Worry: Not on file     Inability: Not on file     Transportation needs     Medical: Not on file     Non-medical: Not on file   Tobacco Use     Smoking status: Former Smoker     Quit date:      Years since quittin.1     Smokeless tobacco: Never Used   Substance and Sexual Activity     Alcohol use: Not Currently     Comment: >30 yrs sobriety ()     Drug use: Not on file     Sexual activity: Not on file   Lifestyle     Physical activity     Days per week: Not on file     Minutes per session: Not on file     Stress: Not on file   Relationships     Social connections     Talks on phone: Not on file     Gets together: Not on file     Attends [employer] service: Not on file     Active member of club or organization: Not on file     Attends meetings of clubs or organizations: Not on file     Relationship status: Not on file     Intimate partner violence     Fear of current or ex partner: Not on file     Emotionally abused: Not on file     Physically abused: Not on file     Forced sexual  "activity: Not on file   Other Topics Concern     Not on file   Social History Narrative     Not on file       ROS:   Constitutional: No fever, chills, or sweats. No weight gain/loss   ENT: No visual disturbance, ear ache, epistaxis, sore throat  Allergies/Immunologic: Negative  Respiratory: No cough, hemoptysia  Cardiovascular: As per HPI  GI: No nausea, vomiting, hematemesis, melena, or hematochezia  : No urinary frequency, dysuria, or hematuria  Integument: Negative  Psychiatric: Negative  Neuro: Negative  Endocrinology: Negative   Musculoskeletal: Negative  Vascular: No walking impairment, claudication, ischemic rest pain or nonhealing wounds    EXAM:  BP (!) 173/103   Pulse 78   Ht 1.816 m (5' 11.5\")   Wt 125.6 kg (277 lb)   BMI 38.10 kg/m    In general, the patient is a pleasant male in no apparent distress.    HEENT: NC/AT.  PERRLA.  EOMI.  Sclerae white, not injected.  Nares clear.  Pharynx without erythema or exudate.  Dentition intact.    Neck: No adenopathy.  No thyromegaly. Carotids +2/2 bilaterally without bruits.  No jugular venous distension.   Heart: RRR. Normal S1, S2 splits physiologically. No murmur, rub, click, or gallop. The PMI is in the 5th ICS in the midclavicular line. There is no heave.    Lungs: CTA.  No ronchi, wheezes, rales.  No dullness to percussion.   Abdomen: Soft, nontender, nondistended. No organomegaly. No AAA.  No bruits.   Extremities: No clubbing, cyanosis, trace edema.  No wounds. No varicose veins signs of chronic venous insufficiency.   Vascular: No bruits are noted.    Labs:  LIPID RESULTS:  No results found for: CHOL, HDL, LDL, TRIG, CHOLHDLRATIO, NHDL    LIVER ENZYME RESULTS:  Lab Results   Component Value Date    AST 15 10/10/2020    ALT 28 10/10/2020       CBC RESULTS:  Lab Results   Component Value Date    WBC 8.7 10/14/2020    RBC 4.20 (L) 10/14/2020    HGB 12.9 (L) 10/14/2020    HCT 38.8 (L) 10/14/2020    MCV 92 10/14/2020    MCH 30.7 10/14/2020    MCHC 33.2 " 10/14/2020    RDW 12.7 10/14/2020     (L) 10/14/2020       BMP RESULTS:  Lab Results   Component Value Date     10/14/2020    POTASSIUM 3.4 10/14/2020    CHLORIDE 103 10/14/2020    CO2 27 10/14/2020    ANIONGAP 6 10/14/2020     (H) 10/14/2020    BUN 11 10/14/2020    CR 0.66 10/14/2020    GFRESTIMATED 73 11/18/2020    GFRESTBLACK 89 11/18/2020    SUN 8.0 (L) 10/14/2020        A1C RESULTS:  Lab Results   Component Value Date    A1C 7.8 (H) 10/10/2020

## 2021-02-12 NOTE — LETTER
2/12/2021    MATT  [facility] [address]  [place] [address]    RE: Leander Wong       Dear Colleague,    I had the pleasure of seeing Leander Wong in the [facility].      HPI: This is a 75 year old male with PMH DMII, HTN, HLD here for follow up for saddle PE. Referred from my colleague Dr. Flores. He was admitted 10/10/2020 with subacute chest pain. Found to have submassive PE; as such, transferred from [facility] to [facility].       In reviewing his records, he had unprovoked submassive PE with acute RV strain. Remained hemodynamically stable on admission and CT showed saddle PE and high distal clot burden. He was started on heparin gtt and underwent catheter based thrombectomy and placement of catheter directed lysis for persistent distal clot burden. Echocardiogram demonstrated moderate dysfunction of the RV post procedure and CTPE 10/11 showed showed non-occlusive embolism at left pulmonary artery, segmental occlusive thrombi at left upper and lower lobes. He was started on apixaban but developed a rash with this and subsequently transtioned to rivaroxaban. He was considered for lifelong anticoagulation therapy.      Since discharge he does not feel he has full stamina back. Every day walks with dog for 30-60 minutes. 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 He has no lower leg cramping and mild LE edema.      Leading up to event he had low grade fever. He was tested for COVID with nasal swab twice. He had no recent long flights (although did travel to [place] several months earlier). He has no known history of VTE in the family, but father and mother both experienced sudden death out of hospital without autopsy. Both were presumed heart attacks. He does have agent orange exposure history.     Interval history: he enrolled in cardiac rehab and has been  doing well. His HR has been under good control. He denies any SOB or chest pain but occasionally he will have a cough. He is hypertensive today, remains on lisinopril. CT chest demonstrated resolved right sided burden, improved left. 2nd and 3rd order vessels still with thrombus.  Echocardiogram demonstrates resolved RV function. No pulmonary hypertension.     ASSESSMENT/PLAN:     75 year old with intermediate - high risk PE s/p catheter directed thrombectomy and catheter directed lysis for high clot burden. Residual RV dysfunction was noted on repeat echocardiogram, with adequate basal contractility but free wall hypokinesis. He is not with LE edema or pain to suggest recurrent DVT, but his functional capacity seems not back to normal at this time. Unable to obtain TR Doppler profile to assess PA systolic function on his repeat echocardiogram. He is enrolled in cardiac rehab and doing well which we will continue.      I wanted to evaluate patient for RPVO (residual pulmonary vascular occlusion) and burden which was demonstrated on CT. Will continue indefinite anticoagulation therapy as his recurrent PE risk is high.      Summary:     1. Cardiac rehab   2. Echocardiogram yearly  3. CT chest or VQ scan repeated only if ongoing symptoms in future   4. Follow up in 3 months   5. Indefinite rivaroxaban   6.  HTN: increase lisinopril to 40 mg daily     Subhash Benson MD MSC  [facility]        PAST MEDICAL HISTORY  Past Medical History:   Diagnosis Date     Diabetes mellitus, type 2 (H)      Essential hypertension      Mixed hyperlipidemia      Prostate cancer (H) 2007    s/p radical prostatectomy     Pulmonary embolism (H)        CURRENT MEDICATIONS  Current Outpatient Medications   Medication Sig Dispense Refill     alpha-lipoic acid 100 MG capsule Take 600 mg by mouth daily (with lunch)       amLODIPine (NORVASC) 5 MG tablet Take 1 tablet (5 mg) by mouth daily 90 tablet 3     atorvastatin (LIPITOR) 20 MG  tablet Take 20 mg by mouth daily       empagliflozin (JARDIANCE) 25 MG TABS tablet daily       glipiZIDE (GLUCOTROL) 5 MG tablet Take 5 mg by mouth daily       lisinopril (ZESTRIL) 20 MG tablet Take 1 tablet (20 mg) by mouth daily 180 tablet 1     rivaroxaban ANTICOAGULANT (XARELTO) 20 MG TABS tablet Take 20 mg by mouth daily       TRULICITY 1.5 MG/0.5ML pen          PAST SURGICAL HISTORY:  Past Surgical History:   Procedure Laterality Date     APPENDECTOMY       CHOLECYSTECTOMY       IR ANGIOGRAM THROUGH CATHETER FOLLOW UP  10/12/2020     IR PULMONARY ANGIOGRAM BILATERAL  10/10/2020       ALLERGIES     Allergies   Allergen Reactions     Apixaban Rash and Itching       FAMILY HISTORY  Family History   Problem Relation Age of Onset     Myocardial Infarction Mother 71     Myocardial Infarction Father 69     Unexplained death Sister          SOCIAL HISTORY  Social History     Socioeconomic History     Marital status: Single     Spouse name: Not on file     Number of children: Not on file     Years of education: Not on file     Highest education level: Not on file   Occupational History     Not on file   Social Needs     Financial resource strain: Not on file     Food insecurity     Worry: Not on file     Inability: Not on file     Transportation needs     Medical: Not on file     Non-medical: Not on file   Tobacco Use     Smoking status: Former Smoker     Quit date:      Years since quittin.1     Smokeless tobacco: Never Used   Substance and Sexual Activity     Alcohol use: Not Currently     Comment: >30 yrs sobriety ()     Drug use: Not on file     Sexual activity: Not on file   Lifestyle     Physical activity     Days per week: Not on file     Minutes per session: Not on file     Stress: Not on file   Relationships     Social connections     Talks on phone: Not on file     Gets together: Not on file     Attends Buddhist service: Not on file     Active member of club or organization: Not on file      "Attends meetings of clubs or organizations: Not on file     Relationship status: Not on file     Intimate partner violence     Fear of current or ex partner: Not on file     Emotionally abused: Not on file     Physically abused: Not on file     Forced sexual activity: Not on file   Other Topics Concern     Not on file   Social History Narrative     Not on file       ROS:   Constitutional: No fever, chills, or sweats. No weight gain/loss   ENT: No visual disturbance, ear ache, epistaxis, sore throat  Allergies/Immunologic: Negative  Respiratory: No cough, hemoptysia  Cardiovascular: As per HPI  GI: No nausea, vomiting, hematemesis, melena, or hematochezia  : No urinary frequency, dysuria, or hematuria  Integument: Negative  Psychiatric: Negative  Neuro: Negative  Endocrinology: Negative   Musculoskeletal: Negative  Vascular: No walking impairment, claudication, ischemic rest pain or nonhealing wounds    EXAM:  BP (!) 173/103   Pulse 78   Ht 1.816 m (5' 11.5\")   Wt 125.6 kg (277 lb)   BMI 38.10 kg/m    In general, the patient is a pleasant male in no apparent distress.    HEENT: NC/AT.  PERRLA.  EOMI.  Sclerae white, not injected.  Nares clear.  Pharynx without erythema or exudate.  Dentition intact.    Neck: No adenopathy.  No thyromegaly. Carotids +2/2 bilaterally without bruits.  No jugular venous distension.   Heart: RRR. Normal S1, S2 splits physiologically. No murmur, rub, click, or gallop. The PMI is in the 5th ICS in the midclavicular line. There is no heave.    Lungs: CTA.  No ronchi, wheezes, rales.  No dullness to percussion.   Abdomen: Soft, nontender, nondistended. No organomegaly. No AAA.  No bruits.   Extremities: No clubbing, cyanosis, trace edema.  No wounds. No varicose veins signs of chronic venous insufficiency.   Vascular: No bruits are noted.    Labs:  LIPID RESULTS:  No results found for: CHOL, HDL, LDL, TRIG, CHOLHDLRATIO, NHDL    LIVER ENZYME RESULTS:  Lab Results   Component Value Date "    AST 15 10/10/2020    ALT 28 10/10/2020       CBC RESULTS:  Lab Results   Component Value Date    WBC 8.7 10/14/2020    RBC 4.20 (L) 10/14/2020    HGB 12.9 (L) 10/14/2020    HCT 38.8 (L) 10/14/2020    MCV 92 10/14/2020    MCH 30.7 10/14/2020    MCHC 33.2 10/14/2020    RDW 12.7 10/14/2020     (L) 10/14/2020       BMP RESULTS:  Lab Results   Component Value Date     10/14/2020    POTASSIUM 3.4 10/14/2020    CHLORIDE 103 10/14/2020    CO2 27 10/14/2020    ANIONGAP 6 10/14/2020     (H) 10/14/2020    BUN 11 10/14/2020    CR 0.66 10/14/2020    GFRESTIMATED 73 11/18/2020    GFRESTBLACK 89 11/18/2020    SUN 8.0 (L) 10/14/2020        A1C RESULTS:  Lab Results   Component Value Date    A1C 7.8 (H) 10/10/2020       Thank you for allowing me to participate in the care of your patient.    Sincerely,     Anais Benson MD     [facility]

## 2021-02-16 ENCOUNTER — HOSPITAL ENCOUNTER (OUTPATIENT)
Dept: CARDIAC REHAB | Facility: CLINIC | Age: 76
End: 2021-02-16
Attending: INTERNAL MEDICINE
Payer: COMMERCIAL

## 2021-02-16 PROCEDURE — G0239 OTH RESP PROC, GROUP: HCPCS

## 2021-02-16 PROCEDURE — 999N000193 HC STATISTIC VISIT PULM REHAB

## 2021-02-18 ENCOUNTER — HOSPITAL ENCOUNTER (OUTPATIENT)
Dept: CARDIAC REHAB | Facility: CLINIC | Age: 76
End: 2021-02-18
Attending: INTERNAL MEDICINE
Payer: COMMERCIAL

## 2021-02-18 PROCEDURE — G0239 OTH RESP PROC, GROUP: HCPCS

## 2021-02-23 ENCOUNTER — HOSPITAL ENCOUNTER (OUTPATIENT)
Dept: CARDIAC REHAB | Facility: CLINIC | Age: 76
End: 2021-02-23
Attending: INTERNAL MEDICINE
Payer: COMMERCIAL

## 2021-02-23 PROCEDURE — G0239 OTH RESP PROC, GROUP: HCPCS

## 2021-02-25 ENCOUNTER — HOSPITAL ENCOUNTER (OUTPATIENT)
Dept: CARDIAC REHAB | Facility: CLINIC | Age: 76
End: 2021-02-25
Attending: INTERNAL MEDICINE
Payer: COMMERCIAL

## 2021-02-25 PROCEDURE — G0239 OTH RESP PROC, GROUP: HCPCS

## 2021-03-02 ENCOUNTER — HOSPITAL ENCOUNTER (OUTPATIENT)
Dept: CARDIAC REHAB | Facility: CLINIC | Age: 76
End: 2021-03-02
Attending: INTERNAL MEDICINE
Payer: COMMERCIAL

## 2021-03-02 PROCEDURE — G0239 OTH RESP PROC, GROUP: HCPCS

## 2021-03-04 ENCOUNTER — HOSPITAL ENCOUNTER (OUTPATIENT)
Dept: CARDIAC REHAB | Facility: CLINIC | Age: 76
End: 2021-03-04
Attending: INTERNAL MEDICINE
Payer: COMMERCIAL

## 2021-03-04 PROCEDURE — G0239 OTH RESP PROC, GROUP: HCPCS

## 2021-03-16 ENCOUNTER — HOSPITAL ENCOUNTER (OUTPATIENT)
Dept: CARDIAC REHAB | Facility: CLINIC | Age: 76
End: 2021-03-16
Attending: INTERNAL MEDICINE
Payer: COMMERCIAL

## 2021-03-16 PROCEDURE — 999N000193 HC STATISTIC VISIT PULM REHAB

## 2021-03-16 PROCEDURE — G0239 OTH RESP PROC, GROUP: HCPCS

## 2021-03-18 ENCOUNTER — HOSPITAL ENCOUNTER (OUTPATIENT)
Dept: CARDIAC REHAB | Facility: CLINIC | Age: 76
End: 2021-03-18
Attending: INTERNAL MEDICINE
Payer: COMMERCIAL

## 2021-03-18 PROCEDURE — G0238 OTH RESP PROC, INDIV: HCPCS

## 2021-05-15 ENCOUNTER — HEALTH MAINTENANCE LETTER (OUTPATIENT)
Age: 76
End: 2021-05-15

## 2021-05-26 ENCOUNTER — OFFICE VISIT (OUTPATIENT)
Dept: CARDIOLOGY | Facility: CLINIC | Age: 76
End: 2021-05-26
Attending: INTERNAL MEDICINE
Payer: COMMERCIAL

## 2021-05-26 VITALS
WEIGHT: 270 LBS | DIASTOLIC BLOOD PRESSURE: 91 MMHG | HEART RATE: 79 BPM | BODY MASS INDEX: 36.57 KG/M2 | HEIGHT: 72 IN | SYSTOLIC BLOOD PRESSURE: 152 MMHG | OXYGEN SATURATION: 93 %

## 2021-05-26 DIAGNOSIS — I26.02 ACUTE SADDLE PULMONARY EMBOLISM WITH ACUTE COR PULMONALE (H): ICD-10-CM

## 2021-05-26 PROCEDURE — 99214 OFFICE O/P EST MOD 30 MIN: CPT | Performed by: INTERNAL MEDICINE

## 2021-05-26 ASSESSMENT — MIFFLIN-ST. JEOR: SCORE: 1999.77

## 2021-05-26 NOTE — LETTER
5/26/2021    [facility] [id number]  [place] [address]    RE: Leander Wong       Dear Colleague,    I had the pleasure of seeing Leander Wong in the [facility].         Vascular Cardiology Consultation Follow Up    HPI:     This is a 75 year old male with PMH DMII, HTN, HLD here for follow up for saddle PE. Referred from my colleague Dr. Flores. He was admitted 10/10/2020 with subacute chest pain. Found to have submassive PE; as such, transferred from [facility] to [facility].       In reviewing his records, he had unprovoked submassive PE with acute RV strain. Remained hemodynamically stable on admission and CT showed saddle PE and high distal clot burden. He was started on heparin gtt and underwent catheter based thrombectomy and placement of catheter directed lysis for persistent distal clot burden. Echocardiogram demonstrated moderate dysfunction of the RV post procedure and CTPE 10/11 showed showed non-occlusive embolism at left pulmonary artery, segmental occlusive thrombi at left upper and lower lobes. He was started on apixaban but developed a rash with this and subsequently transtioned to rivaroxaban. He was considered for lifelong anticoagulation therapy.      Since discharge he did not feel he has full stamina back. Every day walks with dog for 30-60 minutes. 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 He has no lower leg cramping and mild LE edema. Has bilateral popliteal nonocclusive DVT.     Leading up to event he had low grade fever. He was tested for COVID with nasal swab twice. He had no recent long flights (although did travel to [place] several months earlier). He has no known history of VTE in the family, but father and mother both experienced sudden death out of hospital without autopsy. Both were presumed heart attacks. He does have agent  orange exposure history.     Interval history: he enrolled in cardiac rehab and did well. His HR has been under good control. He denies any SOB or chest pain but occasionally he will have a cough. He is hypertensive today, remains on lisinopril which was increased to 40 mg daily. CT chest demonstrated resolved right sided burden, improved left. 2nd and 3rd order vessels still with thrombus.  Echocardiogram demonstrates resolved RV function. No pulmonary hypertension.    He finished cardiac rehab and did excellent. Really enjoyed the experience. Breathing fine now and back to baseline in his opinion. On rivaroxaban and no bleeding. Has colonoscopy scheduled soon. Lifting weights and on treadmill now 3x a week. Blood pressure elevated today 152/91 mmHg, BP was 132/78 last week with endocrinologist.      ASSESSMENT/PLAN:     75 year old with intermediate - high risk PE s/p catheter directed thrombectomy and catheter directed lysis for high clot burden. Overall improved RV function on echocardiogram and completed rehab and did well. No signs of pulmonary hypertension to provoke a patient evaluation for RPVO (residual pulmonary vascular occlusion) and burden which was demonstrated on CT. Will continue indefinite anticoagulation therapy as his recurrent PE risk is high and if this was provoked from COVID illness we suspect ongoing hypercoaguability potentially until further research is undertaken.     Will plan on 6 month echocardiogram and LE ultrasound and follow up with me.    Subhash Benson MD MSC  [facility]         PAST MEDICAL HISTORY  Past Medical History:   Diagnosis Date     Diabetes mellitus, type 2 (H)      Essential hypertension      Mixed hyperlipidemia      Prostate cancer (H) 2007    s/p radical prostatectomy     Pulmonary embolism (H)        CURRENT MEDICATIONS  Current Outpatient Medications   Medication Sig Dispense Refill     alpha-lipoic acid 100 MG capsule Take 600 mg by mouth daily (with  lunch)       amLODIPine (NORVASC) 5 MG tablet Take 1 tablet (5 mg) by mouth daily 90 tablet 3     atorvastatin (LIPITOR) 20 MG tablet Take 20 mg by mouth daily       empagliflozin (JARDIANCE) 25 MG TABS tablet daily       glipiZIDE (GLUCOTROL) 5 MG tablet Take 5 mg by mouth daily       lisinopril (ZESTRIL) 40 MG tablet Take 1 tablet (40 mg) by mouth daily 30 tablet 3     rivaroxaban ANTICOAGULANT (XARELTO) 20 MG TABS tablet Take 20 mg by mouth daily       TRULICITY 1.5 MG/0.5ML pen          PAST SURGICAL HISTORY:  Past Surgical History:   Procedure Laterality Date     APPENDECTOMY       CHOLECYSTECTOMY       IR ANGIOGRAM THROUGH CATHETER FOLLOW UP  10/12/2020     IR PULMONARY ANGIOGRAM BILATERAL  10/10/2020       ALLERGIES     Allergies   Allergen Reactions     Apixaban Rash and Itching       FAMILY HISTORY  Family History   Problem Relation Age of Onset     Myocardial Infarction Mother 71     Myocardial Infarction Father 69     Unexplained death Sister          SOCIAL HISTORY  Social History     Socioeconomic History     Marital status: Single     Spouse name: Not on file     Number of children: Not on file     Years of education: Not on file     Highest education level: Not on file   Occupational History     Not on file   Social Needs     Financial resource strain: Not on file     Food insecurity     Worry: Not on file     Inability: Not on file     Transportation needs     Medical: Not on file     Non-medical: Not on file   Tobacco Use     Smoking status: Former Smoker     Quit date:      Years since quittin.4     Smokeless tobacco: Never Used   Substance and Sexual Activity     Alcohol use: Not Currently     Comment: >30 yrs sobriety ()     Drug use: Not on file     Sexual activity: Not on file   Lifestyle     Physical activity     Days per week: Not on file     Minutes per session: Not on file     Stress: Not on file   Relationships     Social connections     Talks on phone: Not on file     Gets  "together: Not on file     Attends Orthodoxy service: Not on file     Active member of club or organization: Not on file     Attends meetings of clubs or organizations: Not on file     Relationship status: Not on file     Intimate partner violence     Fear of current or ex partner: Not on file     Emotionally abused: Not on file     Physically abused: Not on file     Forced sexual activity: Not on file   Other Topics Concern     Not on file   Social History Narrative     Not on file       ROS:   Constitutional: No fever, chills, or sweats. No weight gain/loss   ENT: No visual disturbance, ear ache, epistaxis, sore throat  Allergies/Immunologic: Negative  Respiratory: No cough, hemoptysia  Cardiovascular: As per HPI  GI: No nausea, vomiting, hematemesis, melena, or hematochezia  : No urinary frequency, dysuria, or hematuria  Integument: Negative  Psychiatric: Negative  Neuro: Negative  Endocrinology: Negative   Musculoskeletal: Negative  Vascular: No walking impairment, claudication, ischemic rest pain or nonhealing wounds    EXAM:  BP (!) 152/91   Pulse 79   Ht 1.816 m (5' 11.5\")   Wt 122.5 kg (270 lb)   SpO2 93%   BMI 37.13 kg/m    In general, the patient is a pleasant male in no apparent distress.    HEENT: NC/AT.  PERRLA.  EOMI.  Sclerae white, not injected.  Nares clear.  Pharynx without erythema or exudate.  Dentition intact.    Neck: No adenopathy.  No thyromegaly. Carotids +2/2 bilaterally without bruits.  No jugular venous distension.   Heart: RRR. Normal S1, S2 splits physiologically. No murmur, rub, click, or gallop.   Lungs: CTA.  No ronchi, wheezes, rales.   Abdomen: Soft, nontender, nondistended.  Extremities: No clubbing, cyanosis, trace edema.  Vascular: No bruits are noted.    Labs:  LIPID RESULTS:  No results found for: CHOL, HDL, LDL, TRIG, CHOLHDLRATIO, NHDL    LIVER ENZYME RESULTS:  Lab Results   Component Value Date    AST 15 10/10/2020    ALT 28 10/10/2020       CBC RESULTS:  Lab Results "   Component Value Date    WBC 8.7 10/14/2020    RBC 4.20 (L) 10/14/2020    HGB 12.9 (L) 10/14/2020    HCT 38.8 (L) 10/14/2020    MCV 92 10/14/2020    MCH 30.7 10/14/2020    MCHC 33.2 10/14/2020    RDW 12.7 10/14/2020     (L) 10/14/2020       BMP RESULTS:  Lab Results   Component Value Date     10/14/2020    POTASSIUM 3.4 10/14/2020    CHLORIDE 103 10/14/2020    CO2 27 10/14/2020    ANIONGAP 6 10/14/2020     (H) 10/14/2020    BUN 11 10/14/2020    CR 0.66 10/14/2020    GFRESTIMATED 73 11/18/2020    GFRESTBLACK 89 11/18/2020    SUN 8.0 (L) 10/14/2020        A1C RESULTS:  Lab Results   Component Value Date    A1C 7.8 (H) 10/10/2020     Thank you for allowing me to participate in the care of your patient.      Sincerely,     Anais Benson MD     [facility]    cc:   Julio Gong  [facility]  [id number]  [place] [address]

## 2021-05-26 NOTE — PROGRESS NOTES
Vascular Cardiology Consultation Follow Up    HPI:     This is a 75 year old male with PMH DMII, HTN, HLD here for follow up for saddle PE. Referred from my colleague Dr. Flores. He was admitted 10/10/2020 with subacute chest pain. Found to have submassive PE; as such, transferred from [facility] to [facility].       In reviewing his records, he had unprovoked submassive PE with acute RV strain. Remained hemodynamically stable on admission and CT showed saddle PE and high distal clot burden. He was started on heparin gtt and underwent catheter based thrombectomy and placement of catheter directed lysis for persistent distal clot burden. Echocardiogram demonstrated moderate dysfunction of the RV post procedure and CTPE 10/11 showed showed non-occlusive embolism at left pulmonary artery, segmental occlusive thrombi at left upper and lower lobes. He was started on apixaban but developed a rash with this and subsequently transtioned to rivaroxaban. He was considered for lifelong anticoagulation therapy.      Since discharge he did not feel he has full stamina back. Every day walks with dog for 30-60 minutes. 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 He has no lower leg cramping and mild LE edema. Has bilateral popliteal nonocclusive DVT.     Leading up to event he had low grade fever. He was tested for COVID with nasal swab twice. He had no recent long flights (although did travel to [place] several months earlier). He has no known history of VTE in the family, but father and mother both experienced sudden death out of hospital without autopsy. Both were presumed heart attacks. He does have agent orange exposure history.     Interval history: he enrolled in cardiac rehab and did well. His HR has been under good control. He denies any SOB or chest pain but occasionally he will have a cough. He is hypertensive today, remains on lisinopril which was  increased to 40 mg daily. CT chest demonstrated resolved right sided burden, improved left. 2nd and 3rd order vessels still with thrombus.  Echocardiogram demonstrates resolved RV function. No pulmonary hypertension.    He finished cardiac rehab and did excellent. Really enjoyed the experience. Breathing fine now and back to baseline in his opinion. On rivaroxaban and no bleeding. Has colonoscopy scheduled soon. Lifting weights and on treadmill now 3x a week. Blood pressure elevated today 152/91 mmHg, BP was 132/78 last week with endocrinologist.      ASSESSMENT/PLAN:     75 year old with intermediate - high risk PE s/p catheter directed thrombectomy and catheter directed lysis for high clot burden. Overall improved RV function on echocardiogram and completed rehab and did well. No signs of pulmonary hypertension to provoke a patient evaluation for RPVO (residual pulmonary vascular occlusion) and burden which was demonstrated on CT. Will continue indefinite anticoagulation therapy as his recurrent PE risk is high and if this was provoked from COVID illness we suspect ongoing hypercoaguability potentially until further research is undertaken.     Will plan on 6 month echocardiogram and LE ultrasound and follow up with me.    Subhash Benson MD MSC  [facility]         PAST MEDICAL HISTORY  Past Medical History:   Diagnosis Date     Diabetes mellitus, type 2 (H)      Essential hypertension      Mixed hyperlipidemia      Prostate cancer (H) 2007    s/p radical prostatectomy     Pulmonary embolism (H)        CURRENT MEDICATIONS  Current Outpatient Medications   Medication Sig Dispense Refill     alpha-lipoic acid 100 MG capsule Take 600 mg by mouth daily (with lunch)       amLODIPine (NORVASC) 5 MG tablet Take 1 tablet (5 mg) by mouth daily 90 tablet 3     atorvastatin (LIPITOR) 20 MG tablet Take 20 mg by mouth daily       empagliflozin (JARDIANCE) 25 MG TABS tablet daily       glipiZIDE (GLUCOTROL) 5 MG tablet  Take 5 mg by mouth daily       lisinopril (ZESTRIL) 40 MG tablet Take 1 tablet (40 mg) by mouth daily 30 tablet 3     rivaroxaban ANTICOAGULANT (XARELTO) 20 MG TABS tablet Take 20 mg by mouth daily       TRULICITY 1.5 MG/0.5ML pen          PAST SURGICAL HISTORY:  Past Surgical History:   Procedure Laterality Date     APPENDECTOMY       CHOLECYSTECTOMY       IR ANGIOGRAM THROUGH CATHETER FOLLOW UP  10/12/2020     IR PULMONARY ANGIOGRAM BILATERAL  10/10/2020       ALLERGIES     Allergies   Allergen Reactions     Apixaban Rash and Itching       FAMILY HISTORY  Family History   Problem Relation Age of Onset     Myocardial Infarction Mother 71     Myocardial Infarction Father 69     Unexplained death Sister          SOCIAL HISTORY  Social History     Socioeconomic History     Marital status: Single     Spouse name: Not on file     Number of children: Not on file     Years of education: Not on file     Highest education level: Not on file   Occupational History     Not on file   Social Needs     Financial resource strain: Not on file     Food insecurity     Worry: Not on file     Inability: Not on file     Transportation needs     Medical: Not on file     Non-medical: Not on file   Tobacco Use     Smoking status: Former Smoker     Quit date:      Years since quittin.4     Smokeless tobacco: Never Used   Substance and Sexual Activity     Alcohol use: Not Currently     Comment: >30 yrs sobriety ()     Drug use: Not on file     Sexual activity: Not on file   Lifestyle     Physical activity     Days per week: Not on file     Minutes per session: Not on file     Stress: Not on file   Relationships     Social connections     Talks on phone: Not on file     Gets together: Not on file     Attends Confucianist service: Not on file     Active member of club or organization: Not on file     Attends meetings of clubs or organizations: Not on file     Relationship status: Not on file     Intimate partner violence     Fear of  "current or ex partner: Not on file     Emotionally abused: Not on file     Physically abused: Not on file     Forced sexual activity: Not on file   Other Topics Concern     Not on file   Social History Narrative     Not on file       ROS:   Constitutional: No fever, chills, or sweats. No weight gain/loss   ENT: No visual disturbance, ear ache, epistaxis, sore throat  Allergies/Immunologic: Negative  Respiratory: No cough, hemoptysia  Cardiovascular: As per HPI  GI: No nausea, vomiting, hematemesis, melena, or hematochezia  : No urinary frequency, dysuria, or hematuria  Integument: Negative  Psychiatric: Negative  Neuro: Negative  Endocrinology: Negative   Musculoskeletal: Negative  Vascular: No walking impairment, claudication, ischemic rest pain or nonhealing wounds    EXAM:  BP (!) 152/91   Pulse 79   Ht 1.816 m (5' 11.5\")   Wt 122.5 kg (270 lb)   SpO2 93%   BMI 37.13 kg/m    In general, the patient is a pleasant male in no apparent distress.    HEENT: NC/AT.  PERRLA.  EOMI.  Sclerae white, not injected.  Nares clear.  Pharynx without erythema or exudate.  Dentition intact.    Neck: No adenopathy.  No thyromegaly. Carotids +2/2 bilaterally without bruits.  No jugular venous distension.   Heart: RRR. Normal S1, S2 splits physiologically. No murmur, rub, click, or gallop.   Lungs: CTA.  No ronchi, wheezes, rales.   Abdomen: Soft, nontender, nondistended.  Extremities: No clubbing, cyanosis, trace edema.  Vascular: No bruits are noted.    Labs:  LIPID RESULTS:  No results found for: CHOL, HDL, LDL, TRIG, CHOLHDLRATIO, NHDL    LIVER ENZYME RESULTS:  Lab Results   Component Value Date    AST 15 10/10/2020    ALT 28 10/10/2020       CBC RESULTS:  Lab Results   Component Value Date    WBC 8.7 10/14/2020    RBC 4.20 (L) 10/14/2020    HGB 12.9 (L) 10/14/2020    HCT 38.8 (L) 10/14/2020    MCV 92 10/14/2020    MCH 30.7 10/14/2020    MCHC 33.2 10/14/2020    RDW 12.7 10/14/2020     (L) 10/14/2020       BMP " RESULTS:  Lab Results   Component Value Date     10/14/2020    POTASSIUM 3.4 10/14/2020    CHLORIDE 103 10/14/2020    CO2 27 10/14/2020    ANIONGAP 6 10/14/2020     (H) 10/14/2020    BUN 11 10/14/2020    CR 0.66 10/14/2020    GFRESTIMATED 73 11/18/2020    GFRESTBLACK 89 11/18/2020    SUN 8.0 (L) 10/14/2020        A1C RESULTS:  Lab Results   Component Value Date    A1C 7.8 (H) 10/10/2020

## 2021-09-04 ENCOUNTER — HEALTH MAINTENANCE LETTER (OUTPATIENT)
Age: 76
End: 2021-09-04

## 2021-09-09 ENCOUNTER — APPOINTMENT (OUTPATIENT)
Dept: URBAN - METROPOLITAN AREA CLINIC 257 | Age: 76
Setting detail: DERMATOLOGY
End: 2021-09-09

## 2021-09-09 DIAGNOSIS — D18.0 HEMANGIOMA: ICD-10-CM

## 2021-09-09 DIAGNOSIS — L82.1 OTHER SEBORRHEIC KERATOSIS: ICD-10-CM

## 2021-09-09 DIAGNOSIS — L57.8 OTHER SKIN CHANGES DUE TO CHRONIC EXPOSURE TO NONIONIZING RADIATION: ICD-10-CM

## 2021-09-09 DIAGNOSIS — L81.4 OTHER MELANIN HYPERPIGMENTATION: ICD-10-CM

## 2021-09-09 DIAGNOSIS — Z71.89 OTHER SPECIFIED COUNSELING: ICD-10-CM

## 2021-09-09 DIAGNOSIS — L57.0 ACTINIC KERATOSIS: ICD-10-CM

## 2021-09-09 DIAGNOSIS — D22 MELANOCYTIC NEVI: ICD-10-CM

## 2021-09-09 PROBLEM — D22.5 MELANOCYTIC NEVI OF TRUNK: Status: ACTIVE | Noted: 2021-09-09

## 2021-09-09 PROBLEM — D18.01 HEMANGIOMA OF SKIN AND SUBCUTANEOUS TISSUE: Status: ACTIVE | Noted: 2021-09-09

## 2021-09-09 PROCEDURE — 17003 DESTRUCT PREMALG LES 2-14: CPT

## 2021-09-09 PROCEDURE — OTHER MIPS QUALITY: OTHER

## 2021-09-09 PROCEDURE — OTHER COUNSELING: OTHER

## 2021-09-09 PROCEDURE — 17000 DESTRUCT PREMALG LESION: CPT

## 2021-09-09 PROCEDURE — OTHER PRESCRIPTION: OTHER

## 2021-09-09 PROCEDURE — 99214 OFFICE O/P EST MOD 30 MIN: CPT | Mod: 25

## 2021-09-09 PROCEDURE — OTHER LIQUID NITROGEN: OTHER

## 2021-09-09 RX ORDER — FLUOROURACIL 2 G/40G
CREAM TOPICAL BID
Qty: 1 | Refills: 1 | Status: ERX | COMMUNITY
Start: 2021-09-09

## 2021-09-09 ASSESSMENT — LOCATION DETAILED DESCRIPTION DERM
LOCATION DETAILED: LEFT PROXIMAL DORSAL FOREARM
LOCATION DETAILED: RIGHT PROXIMAL DORSAL FOREARM
LOCATION DETAILED: LEFT CENTRAL MALAR CHEEK
LOCATION DETAILED: RIGHT MEDIAL UPPER BACK
LOCATION DETAILED: INFERIOR THORACIC SPINE

## 2021-09-09 ASSESSMENT — LOCATION SIMPLE DESCRIPTION DERM
LOCATION SIMPLE: RIGHT FOREARM
LOCATION SIMPLE: UPPER BACK
LOCATION SIMPLE: RIGHT UPPER BACK
LOCATION SIMPLE: LEFT FOREARM
LOCATION SIMPLE: LEFT CHEEK

## 2021-09-09 ASSESSMENT — LOCATION ZONE DERM
LOCATION ZONE: TRUNK
LOCATION ZONE: ARM
LOCATION ZONE: FACE

## 2021-09-09 NOTE — PROCEDURE: MIPS QUALITY
Quality 431: Preventive Care And Screening: Unhealthy Alcohol Use - Screening: Patient screened for unhealthy alcohol use using a single question and scores less than 2 times per year
Quality 226: Preventive Care And Screening: Tobacco Use: Screening And Cessation Intervention: Patient screened for tobacco use and is an ex/non-smoker
Detail Level: Detailed
Quality 111:Pneumonia Vaccination Status For Older Adults: Pneumococcal Vaccination not Administered or Previously Received, Reason not Otherwise Specified
Quality 130: Documentation Of Current Medications In The Medical Record: Current Medications Documented
Quality 110: Preventive Care And Screening: Influenza Immunization: Influenza Immunization Ordered or Recommended, but not Administered due to system reason

## 2021-09-09 NOTE — PROCEDURE: LIQUID NITROGEN
Consent: The patient's consent was obtained including but not limited to risks of crusting, scabbing, blistering, scarring, darker or lighter pigmentary change, recurrence, incomplete removal and infection.
Post-Care Instructions: I reviewed with the patient in detail post-care instructions. Patient is to wear sunprotection, and avoid picking at any of the treated lesions. Pt may apply Vaseline to crusted or scabbing areas.
Total Number Of Aks Treated: 12
Detail Level: Zone
Render In Bullet Format When Appropriate: No
Render Post-Care Instructions In Note?: yes
Duration Of Freeze Thaw-Cycle (Seconds): 0

## 2021-12-01 ENCOUNTER — HOSPITAL ENCOUNTER (OUTPATIENT)
Dept: CARDIOLOGY | Facility: CLINIC | Age: 76
Discharge: HOME | End: 2021-12-01
Attending: INTERNAL MEDICINE | Admitting: INTERNAL MEDICINE
Payer: COMMERCIAL

## 2021-12-01 ENCOUNTER — ANCILLARY PROCEDURE (OUTPATIENT)
Dept: VASCULAR ULTRASOUND | Facility: CLINIC | Age: 76
End: 2021-12-01
Attending: INTERNAL MEDICINE
Payer: COMMERCIAL

## 2021-12-01 DIAGNOSIS — I26.02 ACUTE SADDLE PULMONARY EMBOLISM WITH ACUTE COR PULMONALE (H): ICD-10-CM

## 2021-12-01 LAB — LVEF ECHO: NORMAL

## 2021-12-01 PROCEDURE — 93306 TTE W/DOPPLER COMPLETE: CPT | Mod: 26 | Performed by: INTERNAL MEDICINE

## 2021-12-01 PROCEDURE — 93970 EXTREMITY STUDY: CPT | Performed by: INTERNAL MEDICINE

## 2021-12-01 PROCEDURE — 93306 TTE W/DOPPLER COMPLETE: CPT

## 2021-12-06 ENCOUNTER — TRANSFERRED RECORDS (OUTPATIENT)
Dept: HEALTH INFORMATION MANAGEMENT | Facility: CLINIC | Age: 76
End: 2021-12-06

## 2021-12-08 ENCOUNTER — OFFICE VISIT (OUTPATIENT)
Dept: CARDIOLOGY | Facility: CLINIC | Age: 76
End: 2021-12-08
Payer: COMMERCIAL

## 2021-12-08 VITALS
SYSTOLIC BLOOD PRESSURE: 133 MMHG | WEIGHT: 278.4 LBS | HEIGHT: 72 IN | DIASTOLIC BLOOD PRESSURE: 88 MMHG | BODY MASS INDEX: 37.71 KG/M2 | HEART RATE: 64 BPM

## 2021-12-08 DIAGNOSIS — I26.02 ACUTE SADDLE PULMONARY EMBOLISM WITH ACUTE COR PULMONALE (H): ICD-10-CM

## 2021-12-08 PROCEDURE — 99213 OFFICE O/P EST LOW 20 MIN: CPT | Performed by: INTERNAL MEDICINE

## 2021-12-08 ASSESSMENT — MIFFLIN-ST. JEOR: SCORE: 2032.87

## 2021-12-08 NOTE — LETTER
12/8/2021    MATT  [facility] [id number]  [place] [address]    RE: Leander Wong       Dear Colleague,     I had the pleasure of seeing Leander Wong in the [facility].      HPI:     This is a 76 year old male with PMH DMII, HTN, HLD here for follow up for saddle PE. Referred from my colleague Dr. Flores. He was admitted 10/10/2020 with subacute chest pain. Found to have submassive PE; as such, transferred from [facility] to [facility].       In reviewing his records, he had unprovoked submassive PE with acute RV strain. Remained hemodynamically stable on admission and CT showed saddle PE and high distal clot burden. He was started on heparin gtt and underwent catheter based thrombectomy and placement of catheter directed lysis for persistent distal clot burden. Echocardiogram demonstrated moderate dysfunction of the RV post procedure and CTPE 10/11 showed showed non-occlusive embolism at left pulmonary artery, segmental occlusive thrombi at left upper and lower lobes. He was started on apixaban but developed a rash with this and subsequently transtioned to rivaroxaban. He was considered for lifelong anticoagulation therapy.      Since discharge he did not feel he has full stamina back. Every day walks with dog for 30-60 minutes. 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 He has no lower leg cramping and mild LE edema. Has bilateral popliteal nonocclusive DVT.     Leading up to event he had low grade fever. He was tested for COVID with nasal swab twice. He had no recent long flights (although did travel to [place] several months earlier). He has no known history of VTE in the family, but father and mother both experienced sudden death out of hospital without autopsy. Both were presumed heart attacks. He does have agent orange exposure history.      CT chest demonstrated resolved right sided burden,  improved left. 2nd and 3rd order vessels still with thrombus.  Echocardiogram demonstrates resolved RV function. No pulmonary hypertension.     He finished cardiac rehab and did excellent. Really enjoyed the experience. Breathing fine now and back to baseline in his opinion. On rivaroxaban and no bleeding. Lifting weights and on treadmill now 3x a week.     Had repeat DVT study that showed chronic calf nonocclusive DVT. Has ongoing mild swelling. Not wearing compression stockings. Has been boosted against COVID.     Recent echocardiogram:     Left ventricular systolic function is normal.  The visual ejection fraction is 55-60%.  The right ventricle is normal in structure, function and size.  Right ventricular systolic pressure could not be approximated due to  inadequate tricuspid regurgitation.  RV base 3.8cm, TAPSE 2.5, S' 12m/s    DVT study:     Right leg: No DVT seen in the RLE.     Left leg: Non-occlusive thrombus in a Left gastrocnemius vein, appears  chronic.     Compared to a prior study from 10/10/2020, the previously noted Right  popliteal vein DVT was not seen on this present study. Previously  there was a non-occlusive DVT in the popliteal vein extending into the  gastrocnemius veins.          ASSESSMENT/PLAN:     76 year old with intermediate - high risk PE s/p catheter directed thrombectomy and catheter directed lysis for high clot burden. Overall improved RV function on echocardiogram and completed rehab and did well. No signs of pulmonary hypertension to provoke a patient evaluation for RPVO (residual pulmonary vascular occlusion) and burden which was demonstrated on CT. Will continue indefinite anticoagulation therapy as his recurrent PE risk is high and if this was provoked from COVID illness we suspect ongoing hypercoaguability potentially.     Will plan on one year follow up with me.    Anais Benson MD MSC  [facility]    PAST MEDICAL HISTORY  Past Medical History:   Diagnosis Date      Diabetes mellitus, type 2 (H)      Essential hypertension      Mixed hyperlipidemia      Prostate cancer (H)     s/p radical prostatectomy     Pulmonary embolism (H)        CURRENT MEDICATIONS  Current Outpatient Medications   Medication Sig Dispense Refill     alpha-lipoic acid 100 MG capsule Take 600 mg by mouth daily (with lunch)       amLODIPine (NORVASC) 5 MG tablet Take 1 tablet (5 mg) by mouth daily 90 tablet 3     atorvastatin (LIPITOR) 20 MG tablet Take 20 mg by mouth daily       empagliflozin (JARDIANCE) 25 MG TABS tablet 25 mg daily        glipiZIDE (GLUCOTROL) 5 MG tablet Take 5 mg by mouth daily       lisinopril (ZESTRIL) 40 MG tablet Take 1 tablet (40 mg) by mouth daily 30 tablet 3     rivaroxaban ANTICOAGULANT (XARELTO) 20 MG TABS tablet Take 20 mg by mouth daily       TRULICITY 1.5 MG/0.5ML pen 1.5 mg 1 shot IM weekly         PAST SURGICAL HISTORY:  Past Surgical History:   Procedure Laterality Date     APPENDECTOMY       CHOLECYSTECTOMY       IR ANGIOGRAM THROUGH CATHETER FOLLOW UP  10/12/2020     IR PULMONARY ANGIOGRAM BILATERAL  10/10/2020       ALLERGIES     Allergies   Allergen Reactions     Apixaban Rash and Itching       FAMILY HISTORY  Family History   Problem Relation Age of Onset     Myocardial Infarction Mother 71     Myocardial Infarction Father 69     Unexplained death Sister      SOCIAL HISTORY  Social History     Socioeconomic History     Marital status:      Spouse name: Not on file     Number of children: Not on file     Years of education: Not on file     Highest education level: Not on file   Occupational History     Not on file   Tobacco Use     Smoking status: Former Smoker     Quit date:      Years since quittin.9     Smokeless tobacco: Never Used   Substance and Sexual Activity     Alcohol use: Not Currently     Comment: >30 yrs sobriety ()     Drug use: Not on file     Sexual activity: Not on file   Other Topics Concern     Not on file   Social  "History Narrative     Not on file     Social Determinants of Health     Financial Resource Strain: Not on file   Food Insecurity: Not on file   Transportation Needs: Not on file   Physical Activity: Not on file   Stress: Not on file   Social Connections: Not on file   Intimate Partner Violence: Not on file   Housing Stability: Not on file       EXAM:  /88 (BP Location: Left arm, Cuff Size: Adult Large)   Pulse 64   Ht 1.816 m (5' 11.5\")   Wt 126.3 kg (278 lb 6.4 oz)   BMI 38.29 kg/m    In general, the patient is a pleasant male in no apparent distress.    HEENT: NC/AT.  PERRLA.  EOMI.    Neck: No jugular venous distension.   Heart: RRR. Normal S1, S2 splits physiologically. No murmur, rub, click, or gallop.  Lungs: CTA.  No ronchi, wheezes, rales.  No dullness to percussion.   Abdomen: Soft, nontender, nondistended. No organomegaly. No AAA.  No bruits.   Extremities: No clubbing, cyanosis, 1+ edema.    Vascular: No bruits are noted.    Labs:  LIPID RESULTS:  No results found for: CHOL, HDL, LDL, TRIG, CHOLHDLRATIO, NHDL    LIVER ENZYME RESULTS:  Lab Results   Component Value Date    AST 15 10/10/2020    ALT 28 10/10/2020       CBC RESULTS:  Lab Results   Component Value Date    WBC 8.7 10/14/2020    RBC 4.20 (L) 10/14/2020    HGB 12.9 (L) 10/14/2020    HCT 38.8 (L) 10/14/2020    MCV 92 10/14/2020    MCH 30.7 10/14/2020    MCHC 33.2 10/14/2020    RDW 12.7 10/14/2020     (L) 10/14/2020       BMP RESULTS:  Lab Results   Component Value Date     10/14/2020    POTASSIUM 3.4 10/14/2020    CHLORIDE 103 10/14/2020    CO2 27 10/14/2020    ANIONGAP 6 10/14/2020     (H) 10/14/2020    BUN 11 10/14/2020    CR 0.66 10/14/2020    GFRESTIMATED 73 11/18/2020    GFRESTBLACK 89 11/18/2020    SUN 8.0 (L) 10/14/2020        A1C RESULTS:  Lab Results   Component Value Date    A1C 7.8 (H) 10/10/2020           Anais Benson MD   [facility]  "

## 2021-12-08 NOTE — PATIENT INSTRUCTIONS
1. Follow up with Dr. Benson in one year   2. No need for repeat ultrasound or echocardiogram unless new symptoms

## 2021-12-08 NOTE — PROGRESS NOTES
HPI:     This is a 76 year old male with PMH DMII, HTN, HLD here for follow up for saddle PE. Referred from my colleague Dr. Flores. He was admitted 10/10/2020 with subacute chest pain. Found to have submassive PE; as such, transferred from [facility] to [facility].       In reviewing his records, he had unprovoked submassive PE with acute RV strain. Remained hemodynamically stable on admission and CT showed saddle PE and high distal clot burden. He was started on heparin gtt and underwent catheter based thrombectomy and placement of catheter directed lysis for persistent distal clot burden. Echocardiogram demonstrated moderate dysfunction of the RV post procedure and CTPE 10/11 showed showed non-occlusive embolism at left pulmonary artery, segmental occlusive thrombi at left upper and lower lobes. He was started on apixaban but developed a rash with this and subsequently transtioned to rivaroxaban. He was considered for lifelong anticoagulation therapy.      Since discharge he did not feel he has full stamina back. Every day walks with dog for 30-60 minutes. 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 He has no lower leg cramping and mild LE edema. Has bilateral popliteal nonocclusive DVT.     Leading up to event he had low grade fever. He was tested for COVID with nasal swab twice. He had no recent long flights (although did travel to [place] several months earlier). He has no known history of VTE in the family, but father and mother both experienced sudden death out of hospital without autopsy. Both were presumed heart attacks. He does have agent orange exposure history.      CT chest demonstrated resolved right sided burden, improved left. 2nd and 3rd order vessels still with thrombus.  Echocardiogram demonstrates resolved RV function. No pulmonary hypertension.     He finished cardiac rehab and did excellent. Really enjoyed the experience.  Breathing fine now and back to baseline in his opinion. On rivaroxaban and no bleeding. Lifting weights and on treadmill now 3x a week.     Had repeat DVT study that showed chronic calf nonocclusive DVT. Has ongoing mild swelling. Not wearing compression stockings. Has been boosted against COVID.     Recent echocardiogram:     Left ventricular systolic function is normal.  The visual ejection fraction is 55-60%.  The right ventricle is normal in structure, function and size.  Right ventricular systolic pressure could not be approximated due to  inadequate tricuspid regurgitation.  RV base 3.8cm, TAPSE 2.5, S' 12m/s    DVT study:     Right leg: No DVT seen in the RLE.     Left leg: Non-occlusive thrombus in a Left gastrocnemius vein, appears  chronic.     Compared to a prior study from 10/10/2020, the previously noted Right  popliteal vein DVT was not seen on this present study. Previously  there was a non-occlusive DVT in the popliteal vein extending into the  gastrocnemius veins.          ASSESSMENT/PLAN:     76 year old with intermediate - high risk PE s/p catheter directed thrombectomy and catheter directed lysis for high clot burden. Overall improved RV function on echocardiogram and completed rehab and did well. No signs of pulmonary hypertension to provoke a patient evaluation for RPVO (residual pulmonary vascular occlusion) and burden which was demonstrated on CT. Will continue indefinite anticoagulation therapy as his recurrent PE risk is high and if this was provoked from COVID illness we suspect ongoing hypercoaguability potentially.     Will plan on one year follow up with me.    Anais Benson MD MSC  [facility]    PAST MEDICAL HISTORY  Past Medical History:   Diagnosis Date     Diabetes mellitus, type 2 (H)      Essential hypertension      Mixed hyperlipidemia      Prostate cancer (H) 2007    s/p radical prostatectomy     Pulmonary embolism (H)        CURRENT MEDICATIONS  Current Outpatient  Medications   Medication Sig Dispense Refill     alpha-lipoic acid 100 MG capsule Take 600 mg by mouth daily (with lunch)       amLODIPine (NORVASC) 5 MG tablet Take 1 tablet (5 mg) by mouth daily 90 tablet 3     atorvastatin (LIPITOR) 20 MG tablet Take 20 mg by mouth daily       empagliflozin (JARDIANCE) 25 MG TABS tablet 25 mg daily        glipiZIDE (GLUCOTROL) 5 MG tablet Take 5 mg by mouth daily       lisinopril (ZESTRIL) 40 MG tablet Take 1 tablet (40 mg) by mouth daily 30 tablet 3     rivaroxaban ANTICOAGULANT (XARELTO) 20 MG TABS tablet Take 20 mg by mouth daily       TRULICITY 1.5 MG/0.5ML pen 1.5 mg 1 shot IM weekly         PAST SURGICAL HISTORY:  Past Surgical History:   Procedure Laterality Date     APPENDECTOMY       CHOLECYSTECTOMY       IR ANGIOGRAM THROUGH CATHETER FOLLOW UP  10/12/2020     IR PULMONARY ANGIOGRAM BILATERAL  10/10/2020       ALLERGIES     Allergies   Allergen Reactions     Apixaban Rash and Itching       FAMILY HISTORY  Family History   Problem Relation Age of Onset     Myocardial Infarction Mother 71     Myocardial Infarction Father 69     Unexplained death Sister      SOCIAL HISTORY  Social History     Socioeconomic History     Marital status:      Spouse name: Not on file     Number of children: Not on file     Years of education: Not on file     Highest education level: Not on file   Occupational History     Not on file   Tobacco Use     Smoking status: Former Smoker     Quit date:      Years since quittin.9     Smokeless tobacco: Never Used   Substance and Sexual Activity     Alcohol use: Not Currently     Comment: >30 yrs sobriety ()     Drug use: Not on file     Sexual activity: Not on file   Other Topics Concern     Not on file   Social History Narrative     Not on file     Social Determinants of Health     Financial Resource Strain: Not on file   Food Insecurity: Not on file   Transportation Needs: Not on file   Physical Activity: Not on file   Stress: Not  "on file   Social Connections: Not on file   Intimate Partner Violence: Not on file   Housing Stability: Not on file       EXAM:  /88 (BP Location: Left arm, Cuff Size: Adult Large)   Pulse 64   Ht 1.816 m (5' 11.5\")   Wt 126.3 kg (278 lb 6.4 oz)   BMI 38.29 kg/m    In general, the patient is a pleasant male in no apparent distress.    HEENT: NC/AT.  PERRLA.  EOMI.    Neck: No jugular venous distension.   Heart: RRR. Normal S1, S2 splits physiologically. No murmur, rub, click, or gallop.  Lungs: CTA.  No ronchi, wheezes, rales.  No dullness to percussion.   Abdomen: Soft, nontender, nondistended. No organomegaly. No AAA.  No bruits.   Extremities: No clubbing, cyanosis, 1+ edema.    Vascular: No bruits are noted.    Labs:  LIPID RESULTS:  No results found for: CHOL, HDL, LDL, TRIG, CHOLHDLRATIO, NHDL    LIVER ENZYME RESULTS:  Lab Results   Component Value Date    AST 15 10/10/2020    ALT 28 10/10/2020       CBC RESULTS:  Lab Results   Component Value Date    WBC 8.7 10/14/2020    RBC 4.20 (L) 10/14/2020    HGB 12.9 (L) 10/14/2020    HCT 38.8 (L) 10/14/2020    MCV 92 10/14/2020    MCH 30.7 10/14/2020    MCHC 33.2 10/14/2020    RDW 12.7 10/14/2020     (L) 10/14/2020       BMP RESULTS:  Lab Results   Component Value Date     10/14/2020    POTASSIUM 3.4 10/14/2020    CHLORIDE 103 10/14/2020    CO2 27 10/14/2020    ANIONGAP 6 10/14/2020     (H) 10/14/2020    BUN 11 10/14/2020    CR 0.66 10/14/2020    GFRESTIMATED 73 11/18/2020    GFRESTBLACK 89 11/18/2020    SUN 8.0 (L) 10/14/2020        A1C RESULTS:  Lab Results   Component Value Date    A1C 7.8 (H) 10/10/2020       "

## 2022-02-19 ENCOUNTER — HEALTH MAINTENANCE LETTER (OUTPATIENT)
Age: 77
End: 2022-02-19

## 2022-06-11 ENCOUNTER — HEALTH MAINTENANCE LETTER (OUTPATIENT)
Age: 77
End: 2022-06-11

## 2022-06-29 ENCOUNTER — MYC MEDICAL ADVICE (OUTPATIENT)
Dept: CARDIOLOGY | Facility: CLINIC | Age: 77
End: 2022-06-29

## 2022-06-29 NOTE — TELEPHONE ENCOUNTER
Patient's wife called and inquired how they should obtain IV infusion for antiviral meds for covid treatment. RN referred patient's wife to PMD team for further guidance as cardiology does not rx or coordinate antiviral treatment. Patient's wife verbalized understanding and in agreement with plan.

## 2022-06-29 NOTE — TELEPHONE ENCOUNTER
marinanow message received. Rn will send update to Dr. Benson for further review per patient request.       Tested positive Monday morning. [facility] saw me Tuesday morning for consult and antiviral. They said I couldn t take the pill format since I m on Xarelto. They submitted my name to the state and I was told the state would call me with time and location for the infusion version. Still waiting to hear from the [facility]. Any suggestions? Am I going down the right path here, for my medical condition?? Feeling worse every day.     Thanks   Beny Wong

## 2022-08-25 ENCOUNTER — APPOINTMENT (OUTPATIENT)
Dept: URBAN - METROPOLITAN AREA CLINIC 257 | Age: 77
Setting detail: DERMATOLOGY
End: 2022-08-25

## 2022-08-25 DIAGNOSIS — L82.1 OTHER SEBORRHEIC KERATOSIS: ICD-10-CM

## 2022-08-25 DIAGNOSIS — L57.8 OTHER SKIN CHANGES DUE TO CHRONIC EXPOSURE TO NONIONIZING RADIATION: ICD-10-CM

## 2022-08-25 DIAGNOSIS — L81.4 OTHER MELANIN HYPERPIGMENTATION: ICD-10-CM

## 2022-08-25 DIAGNOSIS — L82.0 INFLAMED SEBORRHEIC KERATOSIS: ICD-10-CM

## 2022-08-25 DIAGNOSIS — D18.0 HEMANGIOMA: ICD-10-CM

## 2022-08-25 DIAGNOSIS — Z71.89 OTHER SPECIFIED COUNSELING: ICD-10-CM

## 2022-08-25 DIAGNOSIS — D22 MELANOCYTIC NEVI: ICD-10-CM

## 2022-08-25 DIAGNOSIS — L57.0 ACTINIC KERATOSIS: ICD-10-CM

## 2022-08-25 PROBLEM — D18.01 HEMANGIOMA OF SKIN AND SUBCUTANEOUS TISSUE: Status: ACTIVE | Noted: 2022-08-25

## 2022-08-25 PROBLEM — D22.5 MELANOCYTIC NEVI OF TRUNK: Status: ACTIVE | Noted: 2022-08-25

## 2022-08-25 PROCEDURE — 17110 DESTRUCT B9 LESION 1-14: CPT

## 2022-08-25 PROCEDURE — OTHER COUNSELING: OTHER

## 2022-08-25 PROCEDURE — OTHER LIQUID NITROGEN: OTHER

## 2022-08-25 PROCEDURE — OTHER MIPS QUALITY: OTHER

## 2022-08-25 PROCEDURE — 17003 DESTRUCT PREMALG LES 2-14: CPT | Mod: 59

## 2022-08-25 PROCEDURE — 17000 DESTRUCT PREMALG LESION: CPT | Mod: 59

## 2022-08-25 PROCEDURE — OTHER REASSURANCE: OTHER

## 2022-08-25 PROCEDURE — 99213 OFFICE O/P EST LOW 20 MIN: CPT | Mod: 25

## 2022-08-25 ASSESSMENT — LOCATION SIMPLE DESCRIPTION DERM
LOCATION SIMPLE: RIGHT ANTERIOR NECK
LOCATION SIMPLE: LEFT TEMPLE
LOCATION SIMPLE: ABDOMEN
LOCATION SIMPLE: UPPER BACK
LOCATION SIMPLE: LEFT UPPER BACK
LOCATION SIMPLE: LEFT CHEEK
LOCATION SIMPLE: CHEST
LOCATION SIMPLE: LEFT ANTERIOR NECK
LOCATION SIMPLE: RIGHT CHEEK
LOCATION SIMPLE: RIGHT UPPER BACK

## 2022-08-25 ASSESSMENT — LOCATION ZONE DERM
LOCATION ZONE: TRUNK
LOCATION ZONE: NECK
LOCATION ZONE: TRUNK
LOCATION ZONE: FACE

## 2022-08-25 ASSESSMENT — LOCATION DETAILED DESCRIPTION DERM
LOCATION DETAILED: LEFT MEDIAL SUPERIOR CHEST
LOCATION DETAILED: LEFT MEDIAL INFERIOR CHEST
LOCATION DETAILED: RIGHT INFERIOR CENTRAL MALAR CHEEK
LOCATION DETAILED: LEFT MID TEMPLE
LOCATION DETAILED: INFERIOR THORACIC SPINE
LOCATION DETAILED: RIGHT INFERIOR ANTERIOR NECK
LOCATION DETAILED: LEFT INFERIOR MEDIAL UPPER BACK
LOCATION DETAILED: STERNUM
LOCATION DETAILED: LEFT LATERAL ABDOMEN
LOCATION DETAILED: RIGHT SUPERIOR MEDIAL UPPER BACK
LOCATION DETAILED: LEFT CLAVICULAR NECK
LOCATION DETAILED: LEFT MEDIAL UPPER BACK
LOCATION DETAILED: LEFT INFERIOR CENTRAL MALAR CHEEK

## 2022-08-25 NOTE — PROCEDURE: MIPS QUALITY
Detail Level: Generalized
Quality 431: Preventive Care And Screening: Unhealthy Alcohol Use - Screening: Patient not identified as an unhealthy alcohol user when screened for unhealthy alcohol use using a systematic screening method
Quality 226: Preventive Care And Screening: Tobacco Use: Screening And Cessation Intervention: Patient screened for tobacco use and is an ex/non-smoker
Quality 130: Documentation Of Current Medications In The Medical Record: Current Medications Documented
Quality 110: Preventive Care And Screening: Influenza Immunization: Influenza Immunization Ordered or Recommended, but not Administered due to system reason
no

## 2022-08-25 NOTE — PROCEDURE: LIQUID NITROGEN
Consent: The patient's consent was obtained including but not limited to risks of crusting, scabbing, blistering, scarring, darker or lighter pigmentary change, recurrence, incomplete removal and infection.
Spray Paint Technique: No
Duration Of Freeze Thaw-Cycle (Seconds): 0
Medical Necessity Clause: This procedure was medically necessary because the lesions that were treated were:
Post-Care Instructions: I reviewed with the patient in detail post-care instructions. Patient is to wear sunprotection, and avoid picking at any of the treated lesions. Pt may apply Vaseline to crusted or scabbing areas.
Spray Paint Text: The liquid nitrogen was applied to the skin utilizing a spray paint frosting technique.
Render Post Care In The Note?: yes
Detail Level: Zone
Medical Necessity Information: It is in your best interest to select a reason for this procedure from the list below. All of these items fulfill various CMS LCD requirements except the new and changing color options.
Total Number Of Aks Treated: 3
Total Number Of Lesions Treated: 6

## 2022-10-16 ENCOUNTER — HEALTH MAINTENANCE LETTER (OUTPATIENT)
Age: 77
End: 2022-10-16

## 2022-11-17 ENCOUNTER — OFFICE VISIT (OUTPATIENT)
Dept: CARDIOLOGY | Facility: CLINIC | Age: 77
End: 2022-11-17
Payer: COMMERCIAL

## 2022-11-17 ENCOUNTER — TELEPHONE (OUTPATIENT)
Dept: CARDIOLOGY | Facility: CLINIC | Age: 77
End: 2022-11-17

## 2022-11-17 VITALS
DIASTOLIC BLOOD PRESSURE: 80 MMHG | BODY MASS INDEX: 37.52 KG/M2 | HEART RATE: 84 BPM | HEIGHT: 72 IN | WEIGHT: 277 LBS | SYSTOLIC BLOOD PRESSURE: 147 MMHG | OXYGEN SATURATION: 94 %

## 2022-11-17 DIAGNOSIS — I82.592 CHRONIC DEEP VEIN THROMBOSIS (DVT) OF OTHER VEIN OF LEFT LOWER EXTREMITY (H): ICD-10-CM

## 2022-11-17 DIAGNOSIS — I26.02 ACUTE SADDLE PULMONARY EMBOLISM WITH ACUTE COR PULMONALE (H): Primary | ICD-10-CM

## 2022-11-17 DIAGNOSIS — M79.89 LEG SWELLING: ICD-10-CM

## 2022-11-17 DIAGNOSIS — E66.01 MORBID OBESITY (H): ICD-10-CM

## 2022-11-17 PROCEDURE — 99214 OFFICE O/P EST MOD 30 MIN: CPT | Performed by: INTERNAL MEDICINE

## 2022-11-17 NOTE — PROGRESS NOTES
HPI:       This is a 77 year old male with PMH DMII, HTN, HLD here for follow up for saddle PE. Referred from my colleague Dr. Flores. He was admitted 10/10/2020 with subacute chest pain. Found to have submassive PE; as such, transferred from [facility] to [facility].       In reviewing his records, he had unprovoked submassive PE with acute RV strain. Remained hemodynamically stable on admission and CT showed saddle PE and high distal clot burden. He was started on heparin gtt and underwent catheter based thrombectomy and placement of catheter directed lysis for persistent distal clot burden. Echocardiogram demonstrated moderate dysfunction of the RV post procedure and CTPE 10/11 showed showed non-occlusive embolism at left pulmonary artery, segmental occlusive thrombi at left upper and lower lobes. He was started on apixaban but developed a rash with this and subsequently transtioned to rivaroxaban. He was considered for lifelong anticoagulation therapy.      Leading up to event he had low grade fever. He was tested for COVID with nasal swab twice. He had no recent long flights (although did travel to [place] several months earlier). He has no known history of VTE in the family, but father and mother both experienced sudden death out of hospital without autopsy. Both were presumed heart attacks. He does have agent orange exposure history.      CT chest demonstrated resolved right sided burden, improved left. 2nd and 3rd order vessels still with thrombus.  Echocardiogram demonstrates resolved RV function. No pulmonary hypertension.      He finished cardiac rehab and did excellent. Really enjoyed the experience. Breathing fine now and back to baseline in his opinion. On rivaroxaban and no bleeding.     Had repeat DVT study that showed chronic calf nonocclusive DVT. Has ongoing mild swelling and varicose veins that are now with some ankle discoloration. Not wearing compression stockings. Has been boosted  against COVID.      Recent echocardiogram:      Left ventricular systolic function is normal.  The visual ejection fraction is 55-60%.  The right ventricle is normal in structure, function and size.  Right ventricular systolic pressure could not be approximated due to  inadequate tricuspid regurgitation.  RV base 3.8cm, TAPSE 2.5, S' 12m/s     DVT study:      Right leg: No DVT seen in the RLE.     Left leg: Non-occlusive thrombus in a Left gastrocnemius vein, appears  chronic.     Compared to a prior study from 10/10/2020, the previously noted Right  popliteal vein DVT was not seen on this present study. Previously  there was a non-occlusive DVT in the popliteal vein extending into the  gastrocnemius veins.            ASSESSMENT/PLAN:     77 year old with intermediate - high risk PE s/p catheter directed thrombectomy and catheter directed lysis for high clot burden. Overall improved RV function on echocardiogram and completed rehab and did well. No signs of pulmonary hypertension to provoke a patient evaluation for RPVO (residual pulmonary vascular occlusion) and burden which was demonstrated on CT.     Will continue indefinite anticoagulation therapy as his recurrent PE risk is high and if this was provoked from COVID illness we suspect ongoing hypercoaguability potentially. He also has chronic dvt and is at increased risk for recurrent DVT. Given new swelling and varicose veins will evaluate with a vein competency study.     Will plan on 1-2 year follow up with me.    Anais Benson MD MSC  [facility]    PAST MEDICAL HISTORY  Past Medical History:   Diagnosis Date     Diabetes mellitus, type 2 (H)      Essential hypertension      Mixed hyperlipidemia      Prostate cancer (H) 2007    s/p radical prostatectomy     Pulmonary embolism (H)        CURRENT MEDICATIONS  Current Outpatient Medications   Medication Sig Dispense Refill     alpha-lipoic acid 100 MG capsule Take 600 mg by mouth daily (with lunch)        amLODIPine (NORVASC) 5 MG tablet Take 1 tablet (5 mg) by mouth daily 90 tablet 3     atorvastatin (LIPITOR) 20 MG tablet Take 20 mg by mouth daily       empagliflozin (JARDIANCE) 25 MG TABS tablet 25 mg daily        glipiZIDE (GLUCOTROL) 5 MG tablet Take 5 mg by mouth daily       lisinopril (ZESTRIL) 40 MG tablet Take 1 tablet (40 mg) by mouth daily 30 tablet 3     rivaroxaban ANTICOAGULANT (XARELTO) 20 MG TABS tablet Take 20 mg by mouth daily       semaglutide (OZEMPIC) 2 MG/1.5ML pen Inject 1 mg Subcutaneous every 7 days       TRULICITY 1.5 MG/0.5ML pen 1.5 mg 1 shot IM weekly (Patient not taking: Reported on 2022)         PAST SURGICAL HISTORY:  Past Surgical History:   Procedure Laterality Date     APPENDECTOMY       CHOLECYSTECTOMY       IR ANGIOGRAM THROUGH CATHETER FOLLOW UP  10/12/2020     IR PULMONARY ANGIOGRAM BILATERAL  10/10/2020       ALLERGIES     Allergies   Allergen Reactions     Apixaban Rash and Itching       FAMILY HISTORY  Family History   Problem Relation Age of Onset     Myocardial Infarction Mother 71     Myocardial Infarction Father 69     Unexplained death Sister          SOCIAL HISTORY  Social History     Socioeconomic History     Marital status:      Spouse name: Not on file     Number of children: Not on file     Years of education: Not on file     Highest education level: Not on file   Occupational History     Not on file   Tobacco Use     Smoking status: Former     Types: Cigarettes     Quit date:      Years since quittin.9     Smokeless tobacco: Never   Substance and Sexual Activity     Alcohol use: Not Currently     Comment: >30 yrs sobriety ()     Drug use: Not on file     Sexual activity: Not on file   Other Topics Concern     Not on file   Social History Narrative     Not on file     Social Determinants of Health     Financial Resource Strain: Not on file   Food Insecurity: Not on file   Transportation Needs: Not on file   Physical Activity: Not on  "file   Stress: Not on file   Social Connections: Not on file   Intimate Partner Violence: Not on file   Housing Stability: Not on file       ROS:   Constitutional: No fever, chills, or sweats. No weight gain/loss   ENT: No visual disturbance, ear ache, epistaxis, sore throat  Allergies/Immunologic: Negative  Respiratory: No cough, hemoptysia  Cardiovascular: As per HPI  GI: No nausea, vomiting, hematemesis, melena, or hematochezia  : No urinary frequency, dysuria, or hematuria  Integument: Negative  Psychiatric: Negative  Neuro: Negative  Endocrinology: Negative   Musculoskeletal: Negative  Vascular: No walking impairment, claudication, ischemic rest pain or nonhealing wounds    EXAM:  BP (!) 147/80   Pulse 84   Ht 1.816 m (5' 11.5\")   Wt 125.6 kg (277 lb)   SpO2 94%   BMI 38.10 kg/m    In general, the patient is a pleasant male in no apparent distress.    HEENT: NC/AT.  PERRLA.  EOMI.  Sclerae white, not injected.    Neck: No adenopathy.  No thyromegaly. Carotids +2/2 bilaterally without bruits.  No jugular venous distension.   Heart: RRR. Normal S1, S2 splits physiologically. No murmur, rub, click, or gallop.  Lungs: CTA.  No ronchi, wheezes, rales.  No dullness to percussion.   Abdomen: Soft, nontender, nondistended.   No wounds. No varicose veins signs of chronic venous insufficiency.   Vascular: No bruits are noted.    Labs:  LIPID RESULTS:  No results found for: CHOL, HDL, LDL, TRIG, CHOLHDLRATIO, NHDL    LIVER ENZYME RESULTS:  Lab Results   Component Value Date    AST 15 10/10/2020    ALT 28 10/10/2020       CBC RESULTS:  Lab Results   Component Value Date    WBC 8.7 10/14/2020    RBC 4.20 (L) 10/14/2020    HGB 12.9 (L) 10/14/2020    HCT 38.8 (L) 10/14/2020    MCV 92 10/14/2020    MCH 30.7 10/14/2020    MCHC 33.2 10/14/2020    RDW 12.7 10/14/2020     (L) 10/14/2020       BMP RESULTS:  Lab Results   Component Value Date     10/14/2020    POTASSIUM 3.4 10/14/2020    CHLORIDE 103 10/14/2020 "    CO2 27 10/14/2020    ANIONGAP 6 10/14/2020     (H) 10/14/2020    BUN 11 10/14/2020    CR 0.66 10/14/2020    GFRESTIMATED 73 11/18/2020    GFRESTBLACK 89 11/18/2020    SUN 8.0 (L) 10/14/2020        A1C RESULTS:  Lab Results   Component Value Date    A1C 7.8 (H) 10/10/2020

## 2022-11-17 NOTE — LETTER
11/17/2022    MATT  [facility] [id number]  [place] [address]    RE: Leander Wong       Dear Colleague,     I had the pleasure of seeing Leander Wong in the [facility].        HPI:       This is a 77 year old male with PMH DMII, HTN, HLD here for follow up for saddle PE. Referred from my colleague Dr. Flores. He was admitted 10/10/2020 with subacute chest pain. Found to have submassive PE; as such, transferred from [facility] to [facility].       In reviewing his records, he had unprovoked submassive PE with acute RV strain. Remained hemodynamically stable on admission and CT showed saddle PE and high distal clot burden. He was started on heparin gtt and underwent catheter based thrombectomy and placement of catheter directed lysis for persistent distal clot burden. Echocardiogram demonstrated moderate dysfunction of the RV post procedure and CTPE 10/11 showed showed non-occlusive embolism at left pulmonary artery, segmental occlusive thrombi at left upper and lower lobes. He was started on apixaban but developed a rash with this and subsequently transtioned to rivaroxaban. He was considered for lifelong anticoagulation therapy.      Leading up to event he had low grade fever. He was tested for COVID with nasal swab twice. He had no recent long flights (although did travel to [place] several months earlier). He has no known history of VTE in the family, but father and mother both experienced sudden death out of hospital without autopsy. Both were presumed heart attacks. He does have agent orange exposure history.      CT chest demonstrated resolved right sided burden, improved left. 2nd and 3rd order vessels still with thrombus.  Echocardiogram demonstrates resolved RV function. No pulmonary hypertension.      He finished cardiac rehab and did excellent. Really enjoyed the experience. Breathing fine now and back to baseline in his opinion. On rivaroxaban and no  bleeding.     Had repeat DVT study that showed chronic calf nonocclusive DVT. Has ongoing mild swelling and varicose veins that are now with some ankle discoloration. Not wearing compression stockings. Has been boosted against COVID.      Recent echocardiogram:      Left ventricular systolic function is normal.  The visual ejection fraction is 55-60%.  The right ventricle is normal in structure, function and size.  Right ventricular systolic pressure could not be approximated due to  inadequate tricuspid regurgitation.  RV base 3.8cm, TAPSE 2.5, S' 12m/s     DVT study:      Right leg: No DVT seen in the RLE.     Left leg: Non-occlusive thrombus in a Left gastrocnemius vein, appears  chronic.     Compared to a prior study from 10/10/2020, the previously noted Right  popliteal vein DVT was not seen on this present study. Previously  there was a non-occlusive DVT in the popliteal vein extending into the  gastrocnemius veins.            ASSESSMENT/PLAN:     77 year old with intermediate - high risk PE s/p catheter directed thrombectomy and catheter directed lysis for high clot burden. Overall improved RV function on echocardiogram and completed rehab and did well. No signs of pulmonary hypertension to provoke a patient evaluation for RPVO (residual pulmonary vascular occlusion) and burden which was demonstrated on CT.     Will continue indefinite anticoagulation therapy as his recurrent PE risk is high and if this was provoked from COVID illness we suspect ongoing hypercoaguability potentially. He also has chronic dvt and is at increased risk for recurrent DVT. Given new swelling and varicose veins will evaluate with a vein competency study.     Will plan on 1-2 year follow up with me.    Anais Benson MD MSC  [facility]    PAST MEDICAL HISTORY  Past Medical History:   Diagnosis Date     Diabetes mellitus, type 2 (H)      Essential hypertension      Mixed hyperlipidemia      Prostate cancer (H) 2007    s/p  radical prostatectomy     Pulmonary embolism (H)        CURRENT MEDICATIONS  Current Outpatient Medications   Medication Sig Dispense Refill     alpha-lipoic acid 100 MG capsule Take 600 mg by mouth daily (with lunch)       amLODIPine (NORVASC) 5 MG tablet Take 1 tablet (5 mg) by mouth daily 90 tablet 3     atorvastatin (LIPITOR) 20 MG tablet Take 20 mg by mouth daily       empagliflozin (JARDIANCE) 25 MG TABS tablet 25 mg daily        glipiZIDE (GLUCOTROL) 5 MG tablet Take 5 mg by mouth daily       lisinopril (ZESTRIL) 40 MG tablet Take 1 tablet (40 mg) by mouth daily 30 tablet 3     rivaroxaban ANTICOAGULANT (XARELTO) 20 MG TABS tablet Take 20 mg by mouth daily       semaglutide (OZEMPIC) 2 MG/1.5ML pen Inject 1 mg Subcutaneous every 7 days       TRULICITY 1.5 MG/0.5ML pen 1.5 mg 1 shot IM weekly (Patient not taking: Reported on 2022)         PAST SURGICAL HISTORY:  Past Surgical History:   Procedure Laterality Date     APPENDECTOMY       CHOLECYSTECTOMY       IR ANGIOGRAM THROUGH CATHETER FOLLOW UP  10/12/2020     IR PULMONARY ANGIOGRAM BILATERAL  10/10/2020       ALLERGIES     Allergies   Allergen Reactions     Apixaban Rash and Itching       FAMILY HISTORY  Family History   Problem Relation Age of Onset     Myocardial Infarction Mother 71     Myocardial Infarction Father 69     Unexplained death Sister          SOCIAL HISTORY  Social History     Socioeconomic History     Marital status:      Spouse name: Not on file     Number of children: Not on file     Years of education: Not on file     Highest education level: Not on file   Occupational History     Not on file   Tobacco Use     Smoking status: Former     Types: Cigarettes     Quit date:      Years since quittin.9     Smokeless tobacco: Never   Substance and Sexual Activity     Alcohol use: Not Currently     Comment: >30 yrs sobriety ()     Drug use: Not on file     Sexual activity: Not on file   Other Topics Concern     Not on  "file   Social History Narrative     Not on file     Social Determinants of Health     Financial Resource Strain: Not on file   Food Insecurity: Not on file   Transportation Needs: Not on file   Physical Activity: Not on file   Stress: Not on file   Social Connections: Not on file   Intimate Partner Violence: Not on file   Housing Stability: Not on file       ROS:   Constitutional: No fever, chills, or sweats. No weight gain/loss   ENT: No visual disturbance, ear ache, epistaxis, sore throat  Allergies/Immunologic: Negative  Respiratory: No cough, hemoptysia  Cardiovascular: As per HPI  GI: No nausea, vomiting, hematemesis, melena, or hematochezia  : No urinary frequency, dysuria, or hematuria  Integument: Negative  Psychiatric: Negative  Neuro: Negative  Endocrinology: Negative   Musculoskeletal: Negative  Vascular: No walking impairment, claudication, ischemic rest pain or nonhealing wounds    EXAM:  BP (!) 147/80   Pulse 84   Ht 1.816 m (5' 11.5\")   Wt 125.6 kg (277 lb)   SpO2 94%   BMI 38.10 kg/m    In general, the patient is a pleasant male in no apparent distress.    HEENT: NC/AT.  PERRLA.  EOMI.  Sclerae white, not injected.    Neck: No adenopathy.  No thyromegaly. Carotids +2/2 bilaterally without bruits.  No jugular venous distension.   Heart: RRR. Normal S1, S2 splits physiologically. No murmur, rub, click, or gallop.  Lungs: CTA.  No ronchi, wheezes, rales.  No dullness to percussion.   Abdomen: Soft, nontender, nondistended.   No wounds. No varicose veins signs of chronic venous insufficiency.   Vascular: No bruits are noted.    Labs:  LIPID RESULTS:  No results found for: CHOL, HDL, LDL, TRIG, CHOLHDLRATIO, NHDL    LIVER ENZYME RESULTS:  Lab Results   Component Value Date    AST 15 10/10/2020    ALT 28 10/10/2020       CBC RESULTS:  Lab Results   Component Value Date    WBC 8.7 10/14/2020    RBC 4.20 (L) 10/14/2020    HGB 12.9 (L) 10/14/2020    HCT 38.8 (L) 10/14/2020    MCV 92 10/14/2020    MCH " 30.7 10/14/2020    MCHC 33.2 10/14/2020    RDW 12.7 10/14/2020     (L) 10/14/2020       BMP RESULTS:  Lab Results   Component Value Date     10/14/2020    POTASSIUM 3.4 10/14/2020    CHLORIDE 103 10/14/2020    CO2 27 10/14/2020    ANIONGAP 6 10/14/2020     (H) 10/14/2020    BUN 11 10/14/2020    CR 0.66 10/14/2020    GFRESTIMATED 73 11/18/2020    GFRESTBLACK 89 11/18/2020    SUN 8.0 (L) 10/14/2020        A1C RESULTS:  Lab Results   Component Value Date    A1C 7.8 (H) 10/10/2020       Thank you for allowing me to participate in the care of your patient.      Sincerely,     Anais Benson MD     [facility]  cc:   No referring provider defined for this encounter.

## 2022-11-28 ENCOUNTER — TELEPHONE (OUTPATIENT)
Dept: CARDIOLOGY | Facility: CLINIC | Age: 77
End: 2022-11-28

## 2022-11-28 ENCOUNTER — ANCILLARY PROCEDURE (OUTPATIENT)
Dept: ULTRASOUND IMAGING | Facility: CLINIC | Age: 77
End: 2022-11-28
Attending: INTERNAL MEDICINE
Payer: COMMERCIAL

## 2022-11-28 DIAGNOSIS — M79.89 LEG SWELLING: ICD-10-CM

## 2022-11-28 DIAGNOSIS — I82.592 CHRONIC DEEP VEIN THROMBOSIS (DVT) OF OTHER VEIN OF LEFT LOWER EXTREMITY (H): ICD-10-CM

## 2022-11-28 PROCEDURE — 93970 EXTREMITY STUDY: CPT | Performed by: INTERNAL MEDICINE

## 2022-11-28 NOTE — TELEPHONE ENCOUNTER
Bilateral venous comp results noted 11/28/22:    FINAL SUMMARY:  1.  No right or left deep vein thrombosis.  2.  Right common femoral, femoral and popliteal vein incompetence.  3.  Right GSV incompetence at saphenofemoral junction only.  4.  Left common femoral, femoral and popliteal vein incompetence.  5.  Left chronic nonocclusive thrombus noted in one of the paired gastrocnemius veins.  6.  Left GSV incompetence in mid and distal calf.    7.  Left varicose vein incompetence.     Incompetence Criteria:  Greater than 500 milliseconds of reflux measured in the superficial and perforating veins and greater than 1000 milliseconds of reflux measured in the deep veins.     Francia Suggs MD [facility]     Will route to Dr. Benson for review.

## 2022-12-02 ENCOUNTER — MYC MEDICAL ADVICE (OUTPATIENT)
Dept: CARDIOLOGY | Facility: CLINIC | Age: 77
End: 2022-12-02

## 2022-12-05 NOTE — TELEPHONE ENCOUNTER
Anais Benson MD Freese, Ashley, RN 15 minutes ago (10:25 AM)     CF  Thanks! Reviewed results. There is a chronic non occlusive thrombus in the calf veins on the left and a lot of reflux in the deep vein system. This makes it difficult to offer any ablation like treatment because the deep system is also incompetent. For this type of vein reflux, compression stockings, calf muscle exercises, leg elevation is the mainstay here. Indefinite anticoagulation is recommended given chronic clot presence.     Best,   Dr. Benson        Patient updated via [employer].

## 2023-03-26 ENCOUNTER — HEALTH MAINTENANCE LETTER (OUTPATIENT)
Age: 78
End: 2023-03-26

## 2023-08-26 ENCOUNTER — HEALTH MAINTENANCE LETTER (OUTPATIENT)
Age: 78
End: 2023-08-26

## 2024-01-13 ENCOUNTER — HEALTH MAINTENANCE LETTER (OUTPATIENT)
Age: 79
End: 2024-01-13

## 2024-06-01 ENCOUNTER — HEALTH MAINTENANCE LETTER (OUTPATIENT)
Age: 79
End: 2024-06-01

## 2024-10-19 ENCOUNTER — HEALTH MAINTENANCE LETTER (OUTPATIENT)
Age: 79
End: 2024-10-19

## 2025-01-25 ENCOUNTER — HEALTH MAINTENANCE LETTER (OUTPATIENT)
Age: 80
End: 2025-01-25

## 2025-05-03 ENCOUNTER — HEALTH MAINTENANCE LETTER (OUTPATIENT)
Age: 80
End: 2025-05-03

## 2025-06-14 ENCOUNTER — HEALTH MAINTENANCE LETTER (OUTPATIENT)
Age: 80
End: 2025-06-14

## 2025-08-16 ENCOUNTER — HEALTH MAINTENANCE LETTER (OUTPATIENT)
Age: 80
End: 2025-08-16

## (undated) RX ORDER — FENTANYL CITRATE 50 UG/ML
INJECTION, SOLUTION INTRAMUSCULAR; INTRAVENOUS
Status: DISPENSED
Start: 2020-10-12

## (undated) RX ORDER — FENTANYL CITRATE 50 UG/ML
INJECTION, SOLUTION INTRAMUSCULAR; INTRAVENOUS
Status: DISPENSED
Start: 2020-10-10

## (undated) RX ORDER — HEPARIN SODIUM 10000 [USP'U]/100ML
INJECTION, SOLUTION INTRAVENOUS
Status: DISPENSED
Start: 2020-10-10

## (undated) RX ORDER — DIPHENHYDRAMINE HYDROCHLORIDE 50 MG/ML
INJECTION INTRAMUSCULAR; INTRAVENOUS
Status: DISPENSED
Start: 2020-10-10

## (undated) RX ORDER — LIDOCAINE HYDROCHLORIDE 10 MG/ML
INJECTION, SOLUTION EPIDURAL; INFILTRATION; INTRACAUDAL; PERINEURAL
Status: DISPENSED
Start: 2020-10-12

## (undated) RX ORDER — LIDOCAINE HYDROCHLORIDE 10 MG/ML
INJECTION, SOLUTION EPIDURAL; INFILTRATION; INTRACAUDAL; PERINEURAL
Status: DISPENSED
Start: 2020-10-14

## (undated) RX ORDER — HEPARIN SODIUM 1000 [USP'U]/ML
INJECTION, SOLUTION INTRAVENOUS; SUBCUTANEOUS
Status: DISPENSED
Start: 2020-10-10

## (undated) RX ORDER — LIDOCAINE HYDROCHLORIDE 10 MG/ML
INJECTION, SOLUTION EPIDURAL; INFILTRATION; INTRACAUDAL; PERINEURAL
Status: DISPENSED
Start: 2020-10-10